# Patient Record
Sex: MALE | Race: OTHER | Employment: OTHER | URBAN - METROPOLITAN AREA
[De-identification: names, ages, dates, MRNs, and addresses within clinical notes are randomized per-mention and may not be internally consistent; named-entity substitution may affect disease eponyms.]

---

## 2017-10-04 ENCOUNTER — HOSPITAL ENCOUNTER (EMERGENCY)
Age: 52
Discharge: HOME | End: 2017-10-05
Attending: EMERGENCY MEDICINE | Admitting: PSYCHIATRY & NEUROLOGY
Payer: COMMERCIAL

## 2017-10-04 DIAGNOSIS — F10.939 ALCOHOL WITHDRAWAL SYNDROME WITH COMPLICATION (HCC): Primary | ICD-10-CM

## 2017-10-04 DIAGNOSIS — F32.A DEPRESSION, UNSPECIFIED DEPRESSION TYPE: ICD-10-CM

## 2017-10-04 LAB
ALBUMIN SERPL-MCNC: 3.1 G/DL (ref 3.4–5)
ALBUMIN/GLOB SERPL: 0.8 {RATIO} (ref 0.8–1.7)
ALP SERPL-CCNC: 104 U/L (ref 45–117)
ALT SERPL-CCNC: 189 U/L (ref 16–61)
AMPHET UR QL SCN: NEGATIVE
ANION GAP SERPL CALC-SCNC: 15 MMOL/L (ref 3–18)
AST SERPL-CCNC: 275 U/L (ref 15–37)
BARBITURATES UR QL SCN: NEGATIVE
BASOPHILS # BLD: 0 K/UL (ref 0–0.1)
BASOPHILS NFR BLD: 1 % (ref 0–2)
BENZODIAZ UR QL: NEGATIVE
BILIRUB SERPL-MCNC: 1.1 MG/DL (ref 0.2–1)
BUN SERPL-MCNC: 9 MG/DL (ref 7–18)
BUN/CREAT SERPL: 9 (ref 12–20)
CALCIUM SERPL-MCNC: 8.1 MG/DL (ref 8.5–10.1)
CANNABINOIDS UR QL SCN: NEGATIVE
CHLORIDE SERPL-SCNC: 100 MMOL/L (ref 100–108)
CO2 SERPL-SCNC: 20 MMOL/L (ref 21–32)
COCAINE UR QL SCN: NEGATIVE
CREAT SERPL-MCNC: 0.97 MG/DL (ref 0.6–1.3)
DIFFERENTIAL METHOD BLD: ABNORMAL
EOSINOPHIL # BLD: 0 K/UL (ref 0–0.4)
EOSINOPHIL NFR BLD: 1 % (ref 0–5)
ERYTHROCYTE [DISTWIDTH] IN BLOOD BY AUTOMATED COUNT: 15 % (ref 11.6–14.5)
ETHANOL SERPL-MCNC: 33 MG/DL (ref 0–3)
GLOBULIN SER CALC-MCNC: 3.8 G/DL (ref 2–4)
GLUCOSE SERPL-MCNC: 108 MG/DL (ref 74–99)
HCT VFR BLD AUTO: 40.9 % (ref 36–48)
HDSCOM,HDSCOM: NORMAL
HGB BLD-MCNC: 14.7 G/DL (ref 13–16)
LYMPHOCYTES # BLD: 1.5 K/UL (ref 0.9–3.6)
LYMPHOCYTES NFR BLD: 36 % (ref 21–52)
MCH RBC QN AUTO: 32.8 PG (ref 24–34)
MCHC RBC AUTO-ENTMCNC: 35.9 G/DL (ref 31–37)
MCV RBC AUTO: 91.3 FL (ref 74–97)
METHADONE UR QL: NEGATIVE
MONOCYTES # BLD: 0.4 K/UL (ref 0.05–1.2)
MONOCYTES NFR BLD: 10 % (ref 3–10)
NEUTS SEG # BLD: 2.2 K/UL (ref 1.8–8)
NEUTS SEG NFR BLD: 52 % (ref 40–73)
OPIATES UR QL: NEGATIVE
PCP UR QL: NEGATIVE
PLATELET # BLD AUTO: 142 K/UL (ref 135–420)
PMV BLD AUTO: 9.9 FL (ref 9.2–11.8)
POTASSIUM SERPL-SCNC: 3.9 MMOL/L (ref 3.5–5.5)
PROT SERPL-MCNC: 6.9 G/DL (ref 6.4–8.2)
RBC # BLD AUTO: 4.48 M/UL (ref 4.7–5.5)
SODIUM SERPL-SCNC: 135 MMOL/L (ref 136–145)
WBC # BLD AUTO: 4.1 K/UL (ref 4.6–13.2)

## 2017-10-04 PROCEDURE — 96360 HYDRATION IV INFUSION INIT: CPT

## 2017-10-04 PROCEDURE — 74011250637 HC RX REV CODE- 250/637: Performed by: EMERGENCY MEDICINE

## 2017-10-04 PROCEDURE — 80307 DRUG TEST PRSMV CHEM ANLYZR: CPT | Performed by: EMERGENCY MEDICINE

## 2017-10-04 PROCEDURE — 74011250636 HC RX REV CODE- 250/636: Performed by: EMERGENCY MEDICINE

## 2017-10-04 PROCEDURE — 80053 COMPREHEN METABOLIC PANEL: CPT | Performed by: EMERGENCY MEDICINE

## 2017-10-04 PROCEDURE — 85025 COMPLETE CBC W/AUTO DIFF WBC: CPT | Performed by: EMERGENCY MEDICINE

## 2017-10-04 PROCEDURE — 99285 EMERGENCY DEPT VISIT HI MDM: CPT

## 2017-10-04 RX ORDER — LORAZEPAM 1 MG/1
1 TABLET ORAL
Status: DISCONTINUED | OUTPATIENT
Start: 2017-10-04 | End: 2017-10-05 | Stop reason: HOSPADM

## 2017-10-04 RX ORDER — LORAZEPAM 1 MG/1
1 TABLET ORAL ONCE
Status: COMPLETED | OUTPATIENT
Start: 2017-10-04 | End: 2017-10-04

## 2017-10-04 RX ORDER — IBUPROFEN 200 MG
1 TABLET ORAL ONCE
Status: COMPLETED | OUTPATIENT
Start: 2017-10-04 | End: 2017-10-05

## 2017-10-04 RX ADMIN — LORAZEPAM 1 MG: 1 TABLET ORAL at 13:46

## 2017-10-04 RX ADMIN — LORAZEPAM 1 MG: 1 TABLET ORAL at 23:27

## 2017-10-04 RX ADMIN — SODIUM CHLORIDE 1000 ML: 900 INJECTION, SOLUTION INTRAVENOUS at 10:08

## 2017-10-04 RX ADMIN — LORAZEPAM 1 MG: 1 TABLET ORAL at 10:08

## 2017-10-04 NOTE — BSMART NOTE
COMPREHENSIVE ASSESSMENT FORM PART 1    SECTION I - DISPOSITION  Patient was discussed with Dr. Su Cruz while patient was in Bed 15 of the ER. Patient's reason for admission:  Abdominal pain, request for alcohol withdrawal detoxification     SECTION II - INTEGRATED SUMMARY  CHIEF COMPLAINT:  Patient is a  47year old who presents to the Emergency Room for a crisis evaluation. Patient states he is with [facility] practice and he spoke with Gunnar Maurice, a Therapist\" who told him to [facility] your things and come to the ER. \"  Patient states he has not been able to eat for the past week, has significant weight loss, has been feeling lightheaded and is currently having problems with his eyes (redness located on the skin under the left eye). He has a drinking ritual that begins at 0900 and ends at 12 midnight each day. He admits to drinking beer, 1 case/24 beers daily. He last drank, last night. \"My drinking is planned,\" patient states. He also states \"all the drinking began\" when his mother passed away in April, 2017 and he has since \"gone off the deep end\" behind her death as he was her sole provider and she resided with him. Patient then stated he was admitted to [facility] [place] for alcohol detox \"about a year ago. \"  Actual [facility] [place] inpatient treatment was 01/07-11/2016. Patient states his psychiatrist is Severiano Mcgraw who was last seen by patient, Juanita Ladd a year ago. \"  Patient states that because he begins drinking at 9am, it is hard for him to plan things since activities, appointments need to be scheduled before he has his first drink of the day. Patient denies being depressed. He denies SI and HI. [address]  Patient is dressed in hospital paper scrubs and slip resistant socks. His speech is clear. His affect is calm and cooperative while responding to questions asked. He is alert and oriented to person, place, time and situation.   The patient's memory shows no evidence of impairment. This patient will be referred to the [facility] ([facility]) for further assessment and evaluation for a possible Crisis Stabilization Unit and/or other facility admission.         Long Mcginnis RN, BSN

## 2017-10-04 NOTE — ED NOTES
Health[employer] meal tray provided and tolerated well. No ss distress. Awaiting bed placement in crisis center. Will continue to monitor closely.

## 2017-10-04 NOTE — ED PROVIDER NOTES
HPI Comments: Gaetano Oakley is a 47 y.o. Male who presents to the ED with request of alcohol detox. Patient reports he was advised by Isabella Alva from [facility] to visit the ED for evaluation. Admits his mother passed away in April 2017 and he has been drinking 1 case of natural light beer everyday from 10AM to Doctor Felisha 91 his last drink was at midnight last night. Also reports depressed mood, shakiness and loss of balance. Patient notes a decreased appetite and increased weight loss, claims he began drinking Ensure, without improvement. Reports abdominal cramping currently. Admits to difficulty sleeping for which he takes Trazadone without relief. Reports visual hallucinations, denies SI or HI. Notes hx of bipolar disorder and schizophrenia in mother. Admits he has not visited his psychiatrist in the past year. Denies hx of HTN or DM. Admits to smoking, denies illicit drug use. No other symptoms or concerns were expressed. The history is provided by the patient. Past Medical History:   Diagnosis Date    Alcohol abuse        No past surgical history on file. No family history on file. Social History     Social History    Marital status:      Spouse name: N/A    Number of children: N/A    Years of education: N/A     Occupational History    Not on file. Social History Main Topics    Smoking status: Not on file    Smokeless tobacco: Not on file    Alcohol use Yes      Comment: 12 beers a day    Drug use: No    Sexual activity: Not on file     Other Topics Concern    Not on file     Social History Narrative    No narrative on file         ALLERGIES: Amoxicillin    Review of Systems   Constitutional: Negative for chills and fever. Respiratory: Negative for shortness of breath. Cardiovascular: Negative for chest pain. Gastrointestinal: Positive for abdominal pain. Negative for diarrhea and nausea. Genitourinary: Negative for difficulty urinating, dysuria and frequency. Neurological: Negative for headaches. Psychiatric/Behavioral: Positive for dysphoric mood and sleep disturbance. Negative for suicidal ideas. All other systems reviewed and are negative. Vitals:    10/04/17 1001 10/04/17 1203 10/04/17 1326 10/04/17 1346   BP:  147/76 115/72    Pulse: (!) 104 97  (!) 108   Resp:  17     Temp:       SpO2:  96%  99%   Weight:       Height:                Physical Exam   Constitutional: He is oriented to person, place, and time. He appears well-developed. HENT:   Head: Normocephalic and atraumatic. Eyes: Conjunctivae and EOM are normal.   Neck: Normal range of motion. Cardiovascular: Normal heart sounds. Exam reveals no gallop and no friction rub. No murmur heard. Occasional tachycardia   Pulmonary/Chest: Effort normal and breath sounds normal. No stridor. Abdominal: Soft. There is no tenderness. Musculoskeletal: Normal range of motion. He exhibits no tenderness. Neurological: He is alert and oriented to person, place, and time. Skin: Skin is warm and dry. He is not diaphoretic. Psychiatric: He exhibits a depressed mood. tearful   Nursing note and vitals reviewed. MDM  Number of Diagnoses or Management Options  Diagnosis management comments: 47 y.o. Male with chronic ETOH use presents now with sx of shakiness, will need to treat symptomatically for withdrawal. Currently hypertensive with HR of 99, will treat with Ativan and fluids. No aleah SI or HI, with visual hallucinations. Will reevaluate later, if required will consult crisis for evaluation. Will check labs for blood count, platelets and liver function.     ED Course       Procedures  Vitals:  Patient Vitals for the past 12 hrs:   Temp Pulse Resp BP SpO2   10/04/17 1346 - (!) 108 - - 99 %   10/04/17 1326 - - - 115/72 -   10/04/17 1203 - 97 17 147/76 96 %   10/04/17 1001 - (!) 104 - - -   10/04/17 1000 - - - 130/53 95 %   10/04/17 0945 98.7 °F (37.1 °C) (!) 111 18 (!) 190/159 98 % Medications ordered:   Medications   nicotine (NICODERM CQ) 14 mg/24 hr patch 1 Patch (1 Patch TransDERmal Apply Patch 10/4/17 1346)   LORazepam (ATIVAN) tablet 1 mg (1 mg Oral Given 10/4/17 1008)   sodium chloride 0.9 % bolus infusion 1,000 mL (0 mL IntraVENous IV Completed 10/4/17 1100)   LORazepam (ATIVAN) tablet 1 mg (1 mg Oral Given 10/4/17 1346)         Lab findings:  Recent Results (from the past 12 hour(s))   CBC WITH AUTOMATED DIFF    Collection Time: 10/04/17 10:04 AM   Result Value Ref Range    WBC 4.1 (L) 4.6 - 13.2 K/uL    RBC 4.48 (L) 4.70 - 5.50 M/uL    HGB 14.7 13.0 - 16.0 g/dL    HCT 40.9 36.0 - 48.0 %    MCV 91.3 74.0 - 97.0 FL    MCH 32.8 24.0 - 34.0 PG    MCHC 35.9 31.0 - 37.0 g/dL    RDW 15.0 (H) 11.6 - 14.5 %    PLATELET 802 159 - 954 K/uL    MPV 9.9 9.2 - 11.8 FL    NEUTROPHILS 52 40 - 73 %    LYMPHOCYTES 36 21 - 52 %    MONOCYTES 10 3 - 10 %    EOSINOPHILS 1 0 - 5 %    BASOPHILS 1 0 - 2 %    ABS. NEUTROPHILS 2.2 1.8 - 8.0 K/UL    ABS. LYMPHOCYTES 1.5 0.9 - 3.6 K/UL    ABS. MONOCYTES 0.4 0.05 - 1.2 K/UL    ABS. EOSINOPHILS 0.0 0.0 - 0.4 K/UL    ABS. BASOPHILS 0.0 0.0 - 0.1 K/UL    DF AUTOMATED     ETHYL ALCOHOL    Collection Time: 10/04/17 10:04 AM   Result Value Ref Range    ALCOHOL(ETHYL),SERUM 33 (H) 0 - 3 MG/DL   METABOLIC PANEL, COMPREHENSIVE    Collection Time: 10/04/17 10:04 AM   Result Value Ref Range    Sodium 135 (L) 136 - 145 mmol/L    Potassium 3.9 3.5 - 5.5 mmol/L    Chloride 100 100 - 108 mmol/L    CO2 20 (L) 21 - 32 mmol/L    Anion gap 15 3.0 - 18 mmol/L    Glucose 108 (H) 74 - 99 mg/dL    BUN 9 7.0 - 18 MG/DL    Creatinine 0.97 0.6 - 1.3 MG/DL    BUN/Creatinine ratio 9 (L) 12 - 20      GFR est AA >60 >60 ml/min/1.73m2    GFR est non-AA >60 >60 ml/min/1.73m2    Calcium 8.1 (L) 8.5 - 10.1 MG/DL    Bilirubin, total 1.1 (H) 0.2 - 1.0 MG/DL    ALT (SGPT) 189 (H) 16 - 61 U/L    AST (SGOT) 275 (H) 15 - 37 U/L    Alk.  phosphatase 104 45 - 117 U/L    Protein, total 6.9 6.4 - 8.2 g/dL    Albumin 3.1 (L) 3.4 - 5.0 g/dL    Globulin 3.8 2.0 - 4.0 g/dL    A-G Ratio 0.8 0.8 - 1.7     DRUG SCREEN, URINE    Collection Time: 10/04/17 10:30 AM   Result Value Ref Range    BENZODIAZEPINES NEGATIVE  NEG      BARBITURATES NEGATIVE  NEG      THC (TH-CANNABINOL) NEGATIVE  NEG      OPIATES NEGATIVE  NEG      PCP(PHENCYCLIDINE) NEGATIVE  NEG      COCAINE NEGATIVE  NEG      AMPHETAMINES NEGATIVE  NEG      METHADONE NEGATIVE  NEG      HDSCOM (NOTE)        Progress notes, Consult notes or additional Procedure notes:   11:05 AM reevaluated patient, patient reports feeling sad and depressed. Patient not tachycardic, improved sx. Will consult crisis. Consult:  Discussed care with marialuisa Wang. Standard discussion; including history of patients chief complaint, available diagnostic results, and treatment course. Gianfranco Au aware of patient in ED. Will evaluate patient. 11:11 AM, 10/4/2017   Consult:  Discussed care with ALEKSANDER Moy. Standard discussion; including history of patients chief complaint, available diagnostic results, and treatment course. Patient has insurance, will be admitted here for further alcohol detox and psychiatric care. 4:49 PM, 10/4/2017       10:17 AM Consult: I discussed care with Gianfranco Au (Crisis). Pt now improved in terms of alcohol withdrawal and no longer suicidal/depressed. He has helped set up follow up for pt with [facility], and pt is now back to his baseline.     -Re-evaluted the patient - improved. -They verbally convey understanding and agreement of the signs, symptoms, diagnosis, treatment and prognosis and additionally agree to follow up as discussed. All questions were answered, and we reviewed pertinent return precautions as seen in the discharge paperwork. Pt understood follow up instructions, and would return to the ED if any worsening or concerns.      Mike Rodriguez MD  10:18 AM          Disposition:  Diagnosis: Depression, alcohol withdrawal    Disposition: Admit to inpt psychiatry >>> DISCHARGE     Follow-up Information     None           Patient's Medications   Start Taking    No medications on file   Continue Taking    PROMETHAZINE (PHENERGAN) 25 MG TABLET    Take 1 Tab by mouth every six (6) hours as needed for up to 10 doses. Indications: EXCESSIVE VOMITING IN PREGNANCY   These Medications have changed    No medications on file   Stop Taking    No medications on file         Scribe Attestation      May Fu acting as a scribe for and in the presence of Karlo Strange MD      October 04, 2017 at 10:11 AM       Provider Attestation:      I personally performed the services described in the documentation, reviewed the documentation, as recorded by the scribe in my presence, and it accurately and completely records my words and actions.  October 04, 2017 at 10:11 AM - Karlo Strange MD

## 2017-10-04 NOTE — ED NOTES
Patient resting comfortably with eyes closed. No ss distress. Lights dimmed and call bell within reach. Will continue to monitor closely.

## 2017-10-04 NOTE — ED NOTES
Patient provide peanut butter and jelly sandwich with ginger ale and tolerated well. No ss distress. Will continue to monitor closely while awaiting further orders.

## 2017-10-04 NOTE — ED NOTES
Patient noted to be walking out of room with gown removed and clothes on. When stopped to ask, he states that SW was in room and states he will be \"waiting here for a couple of hours\" until disposition. He requests walking to car to retrieve cell phone and to \"warm up\" because room \"so cold\". Explained to patient that he could not leave with IV in, patient addiment that he would be returning to room. Recommended removing IV for now. Explained that IV may be needed at later time and patient expressed understanding, but still requested IV be removed. Security notified that patient would be returning to room after visiting car.

## 2017-10-04 NOTE — ED TRIAGE NOTES
Patient states he has been drinking 24 cans of beer since April when his mother passed. States for the last week, he has been experiencing diffuse abdominal pain with NVD.

## 2017-10-05 VITALS
SYSTOLIC BLOOD PRESSURE: 129 MMHG | HEART RATE: 88 BPM | TEMPERATURE: 98.2 F | WEIGHT: 170 LBS | HEIGHT: 68 IN | BODY MASS INDEX: 25.76 KG/M2 | RESPIRATION RATE: 16 BRPM | OXYGEN SATURATION: 98 % | DIASTOLIC BLOOD PRESSURE: 80 MMHG

## 2017-10-05 PROBLEM — F10.20 ALCOHOL DEPENDENCE (HCC): Status: ACTIVE | Noted: 2017-10-05

## 2017-10-05 PROCEDURE — 74011250637 HC RX REV CODE- 250/637: Performed by: EMERGENCY MEDICINE

## 2017-10-05 RX ORDER — TRAZODONE HYDROCHLORIDE 50 MG/1
50 TABLET ORAL
Status: CANCELLED | OUTPATIENT
Start: 2017-10-05

## 2017-10-05 RX ORDER — HALOPERIDOL 10 MG/1
5 TABLET ORAL
Status: CANCELLED | OUTPATIENT
Start: 2017-10-05

## 2017-10-05 RX ORDER — LOPERAMIDE HYDROCHLORIDE 2 MG/1
2 CAPSULE ORAL
Status: CANCELLED | OUTPATIENT
Start: 2017-10-05

## 2017-10-05 RX ORDER — LANOLIN ALCOHOL/MO/W.PET/CERES
100 CREAM (GRAM) TOPICAL DAILY
Status: CANCELLED | OUTPATIENT
Start: 2017-10-05

## 2017-10-05 RX ORDER — LORAZEPAM 2 MG/ML
1-2 INJECTION INTRAMUSCULAR
Status: CANCELLED | OUTPATIENT
Start: 2017-10-05

## 2017-10-05 RX ORDER — PANTOPRAZOLE SODIUM 20 MG/1
20 TABLET, DELAYED RELEASE ORAL DAILY
Status: CANCELLED | OUTPATIENT
Start: 2017-10-05

## 2017-10-05 RX ORDER — IBUPROFEN 600 MG/1
600 TABLET ORAL
Status: CANCELLED | OUTPATIENT
Start: 2017-10-05

## 2017-10-05 RX ORDER — CHLORDIAZEPOXIDE HYDROCHLORIDE 25 MG/1
25 CAPSULE, GELATIN COATED ORAL
Status: CANCELLED | OUTPATIENT
Start: 2017-10-05

## 2017-10-05 RX ORDER — PROMETHAZINE HYDROCHLORIDE 25 MG/ML
6.25 INJECTION, SOLUTION INTRAMUSCULAR; INTRAVENOUS
Status: CANCELLED | OUTPATIENT
Start: 2017-10-05 | End: 2017-10-06

## 2017-10-05 RX ORDER — LORAZEPAM 1 MG/1
1-2 TABLET ORAL
Status: CANCELLED | OUTPATIENT
Start: 2017-10-05

## 2017-10-05 RX ORDER — HALOPERIDOL 5 MG/ML
5 INJECTION INTRAMUSCULAR
Status: CANCELLED | OUTPATIENT
Start: 2017-10-05

## 2017-10-05 RX ORDER — CHLORDIAZEPOXIDE HYDROCHLORIDE 5 MG/1
25 CAPSULE, GELATIN COATED ORAL
Qty: 10 CAP | Refills: 0 | Status: ON HOLD | OUTPATIENT
Start: 2017-10-05 | End: 2018-09-11

## 2017-10-05 RX ADMIN — LORAZEPAM 1 MG: 1 TABLET ORAL at 05:32

## 2017-10-05 NOTE — ED NOTES
Received bedside report from Radha Pelayo, [place], pt alert and oriented, no complaints, VSS, pt awaiting crisis bed assignment, pt tolerating food well, receiving ativan as needed, pt denies complaint at this time, no withdrawal symptoms currently, least drink midnight, pt states he has been drinking heavily for months since his mother passed, denies HI, SI, hallucinations at this time. Pt put in paper scrubs, call bell in reach, safety intact.

## 2017-10-05 NOTE — BSMART NOTE
Met with patient in assigned room. Informed him that discharges are planned and to anticipate coming over to BMS. Patient stated he currently is feeling better, not shaky, not nauseated, and able to eat. States he feels that he rather go home that be admitted for detox. Asked if can have medication for another two days if needed for detox symptoms. Patient denies any suicidal ideations. Discussed with Dr. Santos Sanchez in the ER. Will give prescription for Librium for any alcohol withdrawal symptoms. Patient receptive of this and grateful. States he plans to find a new provider for medications. Not satisfied with current provider a CPA. Given outpatient referral list and AA meeting guide.

## 2017-10-05 NOTE — BSMART NOTE
Addendum:  Pt with daily alcohol use, 24 beers daily for an extended period of time. In acute withdrawal in the ED as evidenced by ; elevated VS, mild tremors even with the 4 doses of ativan given in the ED, mildly diaphoretic, and multiple alcohol related medical complaints. Discussed with Dr Carolyne Garduno, orders received for adm. Discussed with MATTHIEU VALERA.       Pt will be adm to first bed that becomes available in am on the open adult unit.

## 2017-10-05 NOTE — ED NOTES
Nursing report given to Vikki, [facility]. Patient resting comfortably on stretcher, denies any complaints at this time.

## 2018-03-12 ENCOUNTER — APPOINTMENT (OUTPATIENT)
Dept: CT IMAGING | Age: 53
End: 2018-03-12
Attending: EMERGENCY MEDICINE
Payer: COMMERCIAL

## 2018-03-12 ENCOUNTER — HOSPITAL ENCOUNTER (EMERGENCY)
Age: 53
Discharge: HOME | End: 2018-03-12
Attending: EMERGENCY MEDICINE | Admitting: EMERGENCY MEDICINE
Payer: COMMERCIAL

## 2018-03-12 ENCOUNTER — APPOINTMENT (OUTPATIENT)
Dept: GENERAL RADIOLOGY | Age: 53
End: 2018-03-12
Attending: EMERGENCY MEDICINE
Payer: COMMERCIAL

## 2018-03-12 VITALS
HEIGHT: 68 IN | TEMPERATURE: 98.6 F | HEART RATE: 121 BPM | DIASTOLIC BLOOD PRESSURE: 104 MMHG | RESPIRATION RATE: 16 BRPM | WEIGHT: 180 LBS | BODY MASS INDEX: 27.28 KG/M2 | OXYGEN SATURATION: 100 % | SYSTOLIC BLOOD PRESSURE: 132 MMHG

## 2018-03-12 DIAGNOSIS — K75.9 HEPATITIS: ICD-10-CM

## 2018-03-12 DIAGNOSIS — F10.20 UNCOMPLICATED ALCOHOL DEPENDENCE (HCC): Primary | ICD-10-CM

## 2018-03-12 DIAGNOSIS — R10.84 ABDOMINAL PAIN, GENERALIZED: ICD-10-CM

## 2018-03-12 DIAGNOSIS — R74.8 ELEVATED LIPASE: ICD-10-CM

## 2018-03-12 LAB
ALBUMIN SERPL-MCNC: 4 G/DL (ref 3.4–5)
ALBUMIN/GLOB SERPL: 1.1 {RATIO} (ref 0.8–1.7)
ALP SERPL-CCNC: 85 U/L (ref 45–117)
ALT SERPL-CCNC: 220 U/L (ref 16–61)
AMPHET UR QL SCN: NEGATIVE
ANION GAP SERPL CALC-SCNC: 11 MMOL/L (ref 3–18)
AST SERPL-CCNC: 452 U/L (ref 15–37)
ATRIAL RATE: 93 BPM
BARBITURATES UR QL SCN: NEGATIVE
BASOPHILS # BLD: 0 K/UL (ref 0–0.06)
BASOPHILS NFR BLD: 0 % (ref 0–2)
BENZODIAZ UR QL: NEGATIVE
BILIRUB SERPL-MCNC: 1.3 MG/DL (ref 0.2–1)
BUN SERPL-MCNC: 3 MG/DL (ref 7–18)
BUN/CREAT SERPL: 3 (ref 12–20)
CALCIUM SERPL-MCNC: 10.1 MG/DL (ref 8.5–10.1)
CALCULATED P AXIS, ECG09: 48 DEGREES
CALCULATED R AXIS, ECG10: 32 DEGREES
CALCULATED T AXIS, ECG11: 39 DEGREES
CANNABINOIDS UR QL SCN: NEGATIVE
CHLORIDE SERPL-SCNC: 97 MMOL/L (ref 100–108)
CO2 SERPL-SCNC: 25 MMOL/L (ref 21–32)
COCAINE UR QL SCN: NEGATIVE
CREAT SERPL-MCNC: 1.05 MG/DL (ref 0.6–1.3)
DIAGNOSIS, 93000: NORMAL
DIFFERENTIAL METHOD BLD: ABNORMAL
EOSINOPHIL # BLD: 0.1 K/UL (ref 0–0.4)
EOSINOPHIL NFR BLD: 1 % (ref 0–5)
ERYTHROCYTE [DISTWIDTH] IN BLOOD BY AUTOMATED COUNT: 13.7 % (ref 11.6–14.5)
ETHANOL SERPL-MCNC: <3 MG/DL (ref 0–3)
GLOBULIN SER CALC-MCNC: 3.6 G/DL (ref 2–4)
GLUCOSE SERPL-MCNC: 131 MG/DL (ref 74–99)
HCT VFR BLD AUTO: 43.9 % (ref 36–48)
HDSCOM,HDSCOM: NORMAL
HGB BLD-MCNC: 15.9 G/DL (ref 13–16)
LIPASE SERPL-CCNC: 513 U/L (ref 73–393)
LYMPHOCYTES # BLD: 1.6 K/UL (ref 0.9–3.6)
LYMPHOCYTES NFR BLD: 17 % (ref 21–52)
MCH RBC QN AUTO: 36.8 PG (ref 24–34)
MCHC RBC AUTO-ENTMCNC: 36.2 G/DL (ref 31–37)
MCV RBC AUTO: 101.6 FL (ref 74–97)
METHADONE UR QL: NEGATIVE
MONOCYTES # BLD: 0.7 K/UL (ref 0.05–1.2)
MONOCYTES NFR BLD: 7 % (ref 3–10)
NEUTS SEG # BLD: 7.3 K/UL (ref 1.8–8)
NEUTS SEG NFR BLD: 75 % (ref 40–73)
OPIATES UR QL: NEGATIVE
P-R INTERVAL, ECG05: 164 MS
PCP UR QL: NEGATIVE
PLATELET # BLD AUTO: 155 K/UL (ref 135–420)
PMV BLD AUTO: 10.8 FL (ref 9.2–11.8)
POTASSIUM SERPL-SCNC: 4.2 MMOL/L (ref 3.5–5.5)
PROT SERPL-MCNC: 7.6 G/DL (ref 6.4–8.2)
Q-T INTERVAL, ECG07: 338 MS
QRS DURATION, ECG06: 82 MS
QTC CALCULATION (BEZET), ECG08: 420 MS
RBC # BLD AUTO: 4.32 M/UL (ref 4.7–5.5)
SODIUM SERPL-SCNC: 133 MMOL/L (ref 136–145)
VENTRICULAR RATE, ECG03: 93 BPM
WBC # BLD AUTO: 9.7 K/UL (ref 4.6–13.2)

## 2018-03-12 PROCEDURE — 74176 CT ABD & PELVIS W/O CONTRAST: CPT

## 2018-03-12 PROCEDURE — 99284 EMERGENCY DEPT VISIT MOD MDM: CPT

## 2018-03-12 PROCEDURE — 85025 COMPLETE CBC W/AUTO DIFF WBC: CPT | Performed by: EMERGENCY MEDICINE

## 2018-03-12 PROCEDURE — 74011250637 HC RX REV CODE- 250/637: Performed by: EMERGENCY MEDICINE

## 2018-03-12 PROCEDURE — 71045 X-RAY EXAM CHEST 1 VIEW: CPT

## 2018-03-12 PROCEDURE — 80307 DRUG TEST PRSMV CHEM ANLYZR: CPT | Performed by: EMERGENCY MEDICINE

## 2018-03-12 PROCEDURE — 93005 ELECTROCARDIOGRAM TRACING: CPT

## 2018-03-12 PROCEDURE — 83690 ASSAY OF LIPASE: CPT | Performed by: EMERGENCY MEDICINE

## 2018-03-12 PROCEDURE — 80053 COMPREHEN METABOLIC PANEL: CPT | Performed by: EMERGENCY MEDICINE

## 2018-03-12 RX ORDER — CHLORDIAZEPOXIDE HYDROCHLORIDE 25 MG/1
50 CAPSULE, GELATIN COATED ORAL
Status: DISCONTINUED | OUTPATIENT
Start: 2018-03-12 | End: 2018-03-13 | Stop reason: HOSPADM

## 2018-03-12 RX ORDER — ONDANSETRON 4 MG/1
TABLET, ORALLY DISINTEGRATING ORAL
Qty: 10 TAB | Refills: 0 | Status: ON HOLD | OUTPATIENT
Start: 2018-03-12 | End: 2018-09-11

## 2018-03-12 RX ORDER — LORAZEPAM 0.5 MG/1
0.5 TABLET ORAL
Status: COMPLETED | OUTPATIENT
Start: 2018-03-12 | End: 2018-03-12

## 2018-03-12 RX ORDER — LORAZEPAM 0.5 MG/1
1 TABLET ORAL
Qty: 10 TAB | Refills: 0 | Status: SHIPPED | OUTPATIENT
Start: 2018-03-12 | End: 2018-09-11

## 2018-03-12 RX ORDER — ACETAMINOPHEN 325 MG/1
650 TABLET ORAL
Status: COMPLETED | OUTPATIENT
Start: 2018-03-12 | End: 2018-03-12

## 2018-03-12 RX ADMIN — LORAZEPAM 0.5 MG: 0.5 TABLET ORAL at 21:37

## 2018-03-12 RX ADMIN — ACETAMINOPHEN 650 MG: 325 TABLET ORAL at 12:24

## 2018-03-12 RX ADMIN — CHLORDIAZEPOXIDE HYDROCHLORIDE 50 MG: 25 CAPSULE ORAL at 15:32

## 2018-03-12 NOTE — DISCHARGE INSTRUCTIONS
Alcohol and Drug Problems: Care Instructions  Your Care Instructions    You can improve your life and health by stopping your use of alcohol or drugs. Ending dependency on alcohol or drugs may help you feel and sleep better. You may get along better with your family, friends, and coworkers. There are medicines and programs that can help. Follow-up care is a key part of your treatment and safety. Be sure to make and go to all appointments, and call your doctor if you are having problems. It's also a good idea to know your test results and keep a list of the medicines you take. How can you care for yourself at home? · If you have been given medicine to help keep you sober or reduce your cravings, be sure to take it as prescribed. · Talk to your doctor about programs that can help you stop using drugs or drinking alcohol. · If your doctor prescribes disulfiram (Antabuse), do not drink any alcohol while you are taking this medicine. You may have severe, even life-threatening, side effects from even small amounts of alcohol. · Do not tempt yourself by keeping alcohol or drugs in your home. · Learn how to say no when other people drink or use drugs. Or don't spend time with people who drink or use drugs. · Use the time and money spent on drinking or drugs to do something fun with your family or friends. Preventing a relapse  · Do not drink alcohol or use drugs at all. Using any amount of alcohol or drugs greatly increases your risk for relapse. · Seek help from organizations such as Alcoholics Anonymous, Narcotics Anonymous, or treatment facilities if you feel the need to drink alcohol or use drugs again. · Remember that recovery is a lifelong process. · Stay away from situations, friends, or places that may lead you to drink or use drugs. · Have a plan to spot and deal with relapse. Learn to recognize changes in your thinking that lead you to drink or use drugs. These are warning signs.  Get help before you start to drink or use drugs again. · Get help as soon as you can if you relapse. Some people make a plan with another person that outlines what they want that person to do for them if they relapse. The plan usually includes how to handle the relapse and who to notify in case of relapse. · Don't give up. Remember that a relapse does not mean that you have failed. Use the experience to learn the triggers that lead you to drink or use drugs. Then quit again. Many people have several relapses before they are able to quit for good. When should you call for help? Call 911 anytime you think you may need emergency care. For example, call if:  ? · You feel you cannot stop from hurting yourself or someone else. ?Call your doctor now or seek immediate medical care if:  ? · You have serious withdrawal symptoms such as confusion, hallucinations, or severe trembling. ? Watch closely for changes in your health, and be sure to contact your doctor if:  ? · You have a relapse. ? · You need more help or support to stop. Where can you learn more? Go to http://jt-julio.info/. Enter [id number] in the search box to learn more about \"Alcohol and Drug Problems: Care Instructions. \"  Current as of: November 3, 2016  Content Version: 11.4  © 5675-2368 [employer]. Care instructions adapted under license by [employer] (which disclaims liability or warranty for this information). If you have questions about a medical condition or this instruction, always ask your healthcare professional. [place] [address] any warranty or liability for your use of this information. Hepatitis: Care Instructions  Your Care Instructions    Hepatitis is inflammation of the liver. It's caused most often by a virus. It can also be caused by heavy drinking over a long time. Certain medicines can make hepatitis worse.  When this condition is severe, the liver can't remove waste from the body. Your body also can't do its other jobs as it should. · Hepatitis A is spread by food or water that has the virus. This type doesn't lead to long-term liver problems. · Hepatitis B is spread through infected blood, semen, or other body fluids during sex. It's also spread by sharing needles to inject drugs. Most people who have it get better after 4 to 8 weeks. After you have had this virus, you will not get it again. If it stays in your body for a long time, it can cause serious liver damage. · Hepatitis C is spread by sharing needles to use drugs. It is sometimes spread through infected blood, semen, or other body fluids during sex. Most people who have this virus have a long-term infection. Sometimes it causes severe liver damage. · Hepatitis from alcohol use can lead to severe liver problems. If you stop drinking, your liver most often will get better. · Some medicines can cause liver damage. These include over-the-counter and herbal medicines. The infection most often goes away when you stop taking the medicine. But this may not be the case if serious liver damage has already happened. · Hepatitis also can be caused when the immune system attacks the liver. This is called autoimmune hepatitis. You can help your liver heal-or lower the chance of liver damage-by following your doctor's advice. Follow-up care is a key part of your treatment and safety. Be sure to make and go to all appointments, and call your doctor if you are having problems. It's also a good idea to know your test results and keep a list of the medicines you take. How can you care for yourself at home? · Be safe with medicines. If your doctor prescribes antiviral medicine, take it exactly as directed. Do not stop or change a medicine without talking to your doctor first.  · Lower your activity to match your energy. · Avoid alcohol for as long as your doctor says. Alcohol can make liver problems worse.  Tell your doctor if you need help to quit. Counseling, support groups, and sometimes medicines can help you stay sober. · Make sure your doctor knows all the medicines you take. Do not take any new medicines unless your doctor says it is okay. · Follow your doctor's advice about your diet. · If you have itchy skin, keep cool, stay out of the sun. Try to wear cotton clothing. Talk to your doctor about using over-the-counter medicines for itching. These include diphenhydramine (Benadryl) and chlorpheniramine (Chlor-Trimeton). Follow the instructions on the label. To prevent spreading hepatitis B or C  · Tell the people you live with or have sex with about your illness as soon as you can. · Don't donate blood or blood products, organs, semen, or eggs (ova). · Stop all sexual activity or use latex condoms until your doctor tells you that you can no longer give the virus to others. Avoid anal contact with a sex partner while you are infected. · Don't share your personal items. These include razors, toothbrushes, towels, and nail files. · Tell your doctor, dentist, and anyone else who may come in contact with your blood about your illness. · If you are pregnant, tell the doctor who will deliver your baby about your illness. If you have hepatitis B, be sure your baby gets medicine to prevent infection. This should start right after birth. · Clean or carefully get rid of anything that has your blood on it. This includes clothing and sanitary pads. · Make sure to clean surfaces that have your blood or any other body fluid on them. Examples are semen and menstrual blood. Use a solution of bleach and water. To dilute household bleach, follow the directions on the label. Clean toilet seats, countertops, and floors. To prevent hepatitis A  · Always wash your hands after you use the bathroom. And be sure to wash them before you touch food.   · If you have been exposed to someone who may have hepatitis A, ask your doctor about a shot of immune globulin. (This is also called gamma globulin.) It can help your body fight the infection. When should you call for help? Call 911 anytime you think you may need emergency care. For example, call if:  ? · You passed out (lost consciousness). ?Call your doctor now or seek immediate medical care if:  ? · You have new or worse belly pain. ? · You have a new or higher fever. ? · You are dizzy or lightheaded, or you feel like you may faint. ? · You have symptoms of dehydration, such as:  ¨ Dry eyes and a dry mouth. ¨ Passing only a little urine. ¨ Feeling thirstier than normal.   ? · You cannot keep down medicine or fluids. ? · You have new or more blood in stools. ? · You have new or worse vomiting or diarrhea. ? Watch closely for changes in your health, and be sure to contact your doctor if:  ? · You do not get better as expected. Where can you learn more? Go to http://jt-julio.info/. Enter 21  in the search box to learn more about \"Hepatitis: Care Instructions. \"  Current as of: March 3, 2017  Content Version: 11.4  © 3255-8762 [employer]. Care instructions adapted under license by [employer] (which disclaims liability or warranty for this information). If you have questions about a medical condition or this instruction, always ask your healthcare professional. [place] [address] any warranty or liability for your use of this information.

## 2018-03-12 NOTE — BSMART NOTE
Doctor-to-Doctor telephone consult between Dr. Michelle Davidson and Dr. Marley President, ER Physician. Patient denies SI and HI; but requested assistance with his depression and alcohol detoxification. Dr. Evie Alvarado requested Lipase (lab) be rechecked to assess for/rule out pancreatitis (being placed on an NPO diet, etc.) and suggested a possible medical admission. Dr. Amando Grider suggested discharge from the ER with medication (Librium). Crisis office will await Dr. Shannon Jacob call to determine if there will be a medical admission for the patient. Admission to [address] [place] [address] is currently on HOLD.         Nteo Irwin, RN, BSN

## 2018-03-12 NOTE — ED TRIAGE NOTES
Pt requesting alcohol detox & lower back/kidney pain. Pt vomited 3 days ago. Pt denies SI & HI.  Pt states he has not eaten anything for 2 days.

## 2018-03-12 NOTE — ED PROVIDER NOTES
EMERGENCY DEPARTMENT HISTORY AND PHYSICAL EXAM    9:15 AM      Date: 3/12/2018  Patient Name: Amy Penny    History of Presenting Illness     No chief complaint on file. History Provided By: Patient    Additional History (Context): Amy Penny is a 47 y.o. male with EtOH abuse who presents to the ED with a chief complaint of constant and worsening alcohol abuse for the past few months with associated symptoms of abdominal pain, back pain and hand tremors. Patient states \"my stomach is killing me and my back is hurting. \" Patient also states he has bilateral hand tremors from excessive alcohol consumption. Patient reports he is a daily drinker and his last consumption was 2 AM this morning. Patient also reports she would like to go on alcohol detoxification. Patient does not state any modifying factors. Patient denies any other symptoms or complaints. PCP: None    Chief Complaint: EtOH abuse/detoxification  Duration:  Months  Timing:  Constant and Worsening  Location: Generalized  Quality: N/A  Severity: Moderate  Modifying Factors: None  Associated Symptoms: abdominal pain, back pain and hand tremors      Current Outpatient Prescriptions   Medication Sig Dispense Refill    chlordiazePOXIDE (LIBRIUM) 5 mg capsule Take 5 Caps by mouth three (3) times daily as needed for Anxiety. Max Daily Amount: 75 mg. Indications: ALCOHOL WITHDRAWAL SYNDROME 10 Cap 0    promethazine (PHENERGAN) 25 mg tablet Take 1 Tab by mouth every six (6) hours as needed for up to 10 doses. Indications: EXCESSIVE VOMITING IN PREGNANCY 10 Tab 0       Past History     Past Medical History:  Past Medical History:   Diagnosis Date    Alcohol abuse        Past Surgical History:  No past surgical history on file. Family History:  No family history on file.     Social History:  Social History   Substance Use Topics    Smoking status: Not on file    Smokeless tobacco: Not on file    Alcohol use Yes      Comment: 12 beers a day Allergies: Allergies   Allergen Reactions    Amoxicillin Unknown (comments)         Review of Systems     Review of Systems   Constitutional: Negative for fever. Gastrointestinal: Positive for abdominal pain. Musculoskeletal: Positive for back pain. Neurological: Positive for tremors. All other systems reviewed and are negative. Physical Exam     Visit Vitals    BP (!) 132/104 (BP 1 Location: Left arm, BP Patient Position: Sitting)    Pulse (!) 121    Temp 98.6 °F (37 °C)    Resp 16    Ht 5' 8\" (1.727 m)    Wt 81.6 kg (180 lb)    SpO2 100%    BMI 27.37 kg/m2       Physical Exam   Constitutional: He is oriented to person, place, and time. He appears well-developed. HENT:   Head: Normocephalic and atraumatic. Eyes: EOM are normal. Pupils are equal, round, and reactive to light. Neck: Normal range of motion. Neck supple. Cardiovascular: Normal rate, regular rhythm and normal heart sounds. Exam reveals no friction rub. No murmur heard. Pulmonary/Chest: Effort normal and breath sounds normal. No respiratory distress. He has no wheezes. Abdominal: Soft. He exhibits no distension. There is no tenderness. There is no rebound and no guarding. Musculoskeletal: Normal range of motion. Neurological: He is alert and oriented to person, place, and time. Skin: Skin is warm and dry. Psychiatric: He has a normal mood and affect. His behavior is normal. Thought content normal.         Diagnostic Study Results     EKG shows sinus at 93 with normal axis and normal intervals there is no ST elevation or depression or hypertrophy    Medical Decision Making   47year-old male wants to quit drinking. He had some mild left upper quadrant pain from drinking has been going on for a few days. Will check basic labs if normal likely cleared for crisis. 3:53 PM crisis is refusing based on elevated LFTs and elevated lipase.   Dr Denise Galeana stated he does not feel psychiatric admission is beneficial as pt is not SI/Hi. Requesting medical admission    He felt librium was innaproprate, however, per uptodate: \"Dosing: Hepatic Impairment   There are no dosage adjustments provided in the s labeling; however, chlordiazepoxide undergoes hepatic metabolism and should be used with caution. \"     with ativan \"Mild-to-moderate impairment: No dosage adjustment necessary. \" although also metabolized by the liver. Will obtain CT. At this point pt is not meeting criteria for medical admission. 5:43 PM ct noted. No flank pain no dysuria. Labs noted. Pt eating, some mild epigastric discomfort. At this point not meeting inpt criteria; will d/w crisis. 7:18 PM dr Amisha Talamantes consulted. Does not feel comfortable with pt on psych but will not comment if pt need psych admission. Medically pt ok for d/c. Psych will not comment on if they feel pt needs inpt or outpt tx for etoh. Pt would like to go home with ativan.  1mg. Has used in the past. Will dw/ GI. Dw/ GI agree, safe. Diagnosis     No diagnosis found. _______________________________    Attestations:  Scribe Attestation     Feliz Avila acting as a scribe for and in the presence of Zahira Mcdaniel MD      March 12, 2018 at 9:15 AM       Provider Attestation:      I personally performed the services described in the documentation, reviewed the documentation, as recorded by the scribe in my presence, and it accurately and completely records my words and actions.  March 12, 2018 at 9:15 AM - Zahira Mcdaniel MD    _______________________________

## 2018-03-12 NOTE — ED NOTES
Patient resting comfortably on stretcher with eyes closed. No visible signs of distress. Will continue to monitor patient.

## 2018-03-12 NOTE — ED NOTES
Patient states his last drink was around 0230 this morning. He states that he has been drinking for a very long time. He usually drinks a large box of wine (5L) every day. Sometimes he may go to the liquor store and buy a half gallon of \"Marino Beam\" which will last him x3 days.

## 2018-03-12 NOTE — BSMART NOTE
COMPREHENSIVE ASSESSMENT FORM PART 1    SECTION I - DISPOSITION  The patient was initially interviewed while in Bed 21 of the [facility] ER at the request of Dr. Mireya Parson. The request for crisis evaluation is due to patients complaints/comments of depression and alcohol withdrawal/detoxification. The plan WAS to transfer the patient from the [facility] ER to [address] [place] [address] Room #959-29  on the Adult/Chemical Dependency Unit. The unit phone is [phone]. The Psychiatrist consulted was Dr. Anna Schwarz who is also the attending Psychiatrist.    [facility] unit admitting diagnosis is  Alcohol detox and depression    SECTION II - INTEGRATED SUMMARY  INFORMATION WAS PROVIDED BY:  Patient    CHIEF COMPLAINT:  Alcohol Detox  PRECIPITANT FACTORS:  Stomach pain from drinking. Patient is a 47year old male who presents to the Emergency Room for a crisis evaluation. Patient complains of  \"stomach pain from drinking. I need to stop drinking. \"  He states he is depressed. He denies SI. He denies HI. He smokes 2 packs of cigarrettes a day. He communicates daily with his two sons who live in the area. His mother passed away April 4, 2017 so he now lives alone but has a girlfriend. His girlfriend does not live with him. He lives with his [facility]. Patient asked this writer, \"Do they know what is what is going on with my back? Is it my kidneys? \"  Patient also requested, \"something to help me sleep. \"    SI / SELF HARM HX / HI:  No current SI.    PRIOR TREATMENT HX:    INPATIENT:   [facility]. DRUG/ALCOHOL/SMOKING HISTORY (last used/daily usage amount): Was drinking 1 case/24 beers daily; however it is causing too much stomach pain. He now drinks boxed wine, 5 liters a day.     CURRENT BAL:        Ruth Mcneill, RN, BSN

## 2018-03-13 NOTE — ED NOTES
Discharge instructions reviewed with pt. Pt voiced concern over medication. Explained to patient that the ativan is for withdrawal symptoms.   Pt concerned about living alone and having a seizure

## 2018-09-08 PROBLEM — K85.90 PANCREATITIS: Status: ACTIVE | Noted: 2018-09-08

## 2019-01-12 PROBLEM — F10.939 ALCOHOL WITHDRAWAL SYNDROME WITH COMPLICATION (HCC): Status: ACTIVE | Noted: 2019-01-12

## 2019-03-26 PROBLEM — F10.939 ALCOHOL WITHDRAWAL (HCC): Status: ACTIVE | Noted: 2019-03-26

## 2019-04-25 ENCOUNTER — HOSPITAL ENCOUNTER (INPATIENT)
Age: 54
LOS: 6 days | Discharge: HOME HEALTH | DRG: 897 | End: 2019-05-01
Attending: EMERGENCY MEDICINE | Admitting: FAMILY MEDICINE
Payer: COMMERCIAL

## 2019-04-25 DIAGNOSIS — R56.9 SEIZURE (HCC): ICD-10-CM

## 2019-04-25 DIAGNOSIS — F10.939 ALCOHOL WITHDRAWAL SYNDROME WITH COMPLICATION (HCC): Primary | ICD-10-CM

## 2019-04-25 PROBLEM — F10.10 ALCOHOL ABUSE: Status: ACTIVE | Noted: 2019-04-25

## 2019-04-25 PROBLEM — E87.6 HYPOKALEMIA: Status: ACTIVE | Noted: 2019-04-25

## 2019-04-25 LAB
ALBUMIN SERPL-MCNC: 2.8 G/DL (ref 3.4–5)
ALBUMIN/GLOB SERPL: 0.7 {RATIO} (ref 0.8–1.7)
ALP SERPL-CCNC: 89 U/L (ref 45–117)
ALT SERPL-CCNC: 49 U/L (ref 16–61)
AMPHET UR QL SCN: NEGATIVE
ANION GAP SERPL CALC-SCNC: 9 MMOL/L (ref 3–18)
APPEARANCE UR: CLEAR
AST SERPL-CCNC: 77 U/L (ref 15–37)
BARBITURATES UR QL SCN: NEGATIVE
BASOPHILS # BLD: 0 K/UL (ref 0–0.1)
BASOPHILS NFR BLD: 0 % (ref 0–2)
BENZODIAZ UR QL: POSITIVE
BILIRUB SERPL-MCNC: 0.6 MG/DL (ref 0.2–1)
BILIRUB UR QL: NEGATIVE
BUN SERPL-MCNC: 4 MG/DL (ref 7–18)
BUN/CREAT SERPL: 4 (ref 12–20)
CALCIUM SERPL-MCNC: 7.7 MG/DL (ref 8.5–10.1)
CANNABINOIDS UR QL SCN: NEGATIVE
CHLORIDE SERPL-SCNC: 103 MMOL/L (ref 100–108)
CO2 SERPL-SCNC: 27 MMOL/L (ref 21–32)
COCAINE UR QL SCN: NEGATIVE
COLOR UR: NORMAL
CREAT SERPL-MCNC: 0.95 MG/DL (ref 0.6–1.3)
DIFFERENTIAL METHOD BLD: ABNORMAL
EOSINOPHIL # BLD: 0.2 K/UL (ref 0–0.4)
EOSINOPHIL NFR BLD: 2 % (ref 0–5)
ERYTHROCYTE [DISTWIDTH] IN BLOOD BY AUTOMATED COUNT: 15.9 % (ref 11.6–14.5)
ETHANOL SERPL-MCNC: 194 MG/DL (ref 0–3)
GLOBULIN SER CALC-MCNC: 4.2 G/DL (ref 2–4)
GLUCOSE SERPL-MCNC: 124 MG/DL (ref 74–99)
GLUCOSE UR STRIP.AUTO-MCNC: NEGATIVE MG/DL
HCT VFR BLD AUTO: 39 % (ref 36–48)
HDSCOM,HDSCOM: ABNORMAL
HGB BLD-MCNC: 13.2 G/DL (ref 13–16)
HGB UR QL STRIP: NEGATIVE
KETONES UR QL STRIP.AUTO: NEGATIVE MG/DL
LEUKOCYTE ESTERASE UR QL STRIP.AUTO: NEGATIVE
LYMPHOCYTES # BLD: 2.5 K/UL (ref 0.9–3.6)
LYMPHOCYTES NFR BLD: 26 % (ref 21–52)
MCH RBC QN AUTO: 31.1 PG (ref 24–34)
MCHC RBC AUTO-ENTMCNC: 33.8 G/DL (ref 31–37)
MCV RBC AUTO: 91.8 FL (ref 74–97)
METHADONE UR QL: NEGATIVE
MONOCYTES # BLD: 0.8 K/UL (ref 0.05–1.2)
MONOCYTES NFR BLD: 8 % (ref 3–10)
NEUTS SEG # BLD: 6 K/UL (ref 1.8–8)
NEUTS SEG NFR BLD: 64 % (ref 40–73)
NITRITE UR QL STRIP.AUTO: NEGATIVE
OPIATES UR QL: NEGATIVE
PCP UR QL: NEGATIVE
PH UR STRIP: 6 [PH] (ref 5–8)
PLATELET # BLD AUTO: 166 K/UL (ref 135–420)
PMV BLD AUTO: 10.6 FL (ref 9.2–11.8)
POTASSIUM SERPL-SCNC: 2.7 MMOL/L (ref 3.5–5.5)
PROT SERPL-MCNC: 7 G/DL (ref 6.4–8.2)
PROT UR STRIP-MCNC: NEGATIVE MG/DL
RBC # BLD AUTO: 4.25 M/UL (ref 4.7–5.5)
SODIUM SERPL-SCNC: 139 MMOL/L (ref 136–145)
SP GR UR REFRACTOMETRY: 1.01 (ref 1–1.03)
UROBILINOGEN UR QL STRIP.AUTO: 1 EU/DL (ref 0.2–1)
WBC # BLD AUTO: 9.4 K/UL (ref 4.6–13.2)

## 2019-04-25 PROCEDURE — 85025 COMPLETE CBC W/AUTO DIFF WBC: CPT

## 2019-04-25 PROCEDURE — 74011250637 HC RX REV CODE- 250/637: Performed by: EMERGENCY MEDICINE

## 2019-04-25 PROCEDURE — 65660000000 HC RM CCU STEPDOWN

## 2019-04-25 PROCEDURE — 80053 COMPREHEN METABOLIC PANEL: CPT

## 2019-04-25 PROCEDURE — 81003 URINALYSIS AUTO W/O SCOPE: CPT

## 2019-04-25 PROCEDURE — 80307 DRUG TEST PRSMV CHEM ANLYZR: CPT

## 2019-04-25 PROCEDURE — 99283 EMERGENCY DEPT VISIT LOW MDM: CPT

## 2019-04-25 RX ORDER — HEPARIN SODIUM 5000 [USP'U]/ML
5000 INJECTION, SOLUTION INTRAVENOUS; SUBCUTANEOUS EVERY 8 HOURS
Status: DISCONTINUED | OUTPATIENT
Start: 2019-04-25 | End: 2019-05-01 | Stop reason: HOSPADM

## 2019-04-25 RX ORDER — LORAZEPAM 2 MG/ML
2 INJECTION INTRAMUSCULAR
Status: DISCONTINUED | OUTPATIENT
Start: 2019-04-25 | End: 2019-04-26

## 2019-04-25 RX ORDER — SODIUM CHLORIDE 0.9 % (FLUSH) 0.9 %
5-40 SYRINGE (ML) INJECTION AS NEEDED
Status: DISCONTINUED | OUTPATIENT
Start: 2019-04-25 | End: 2019-04-29 | Stop reason: SDUPTHER

## 2019-04-25 RX ORDER — POTASSIUM CHLORIDE 20 MEQ/1
40 TABLET, EXTENDED RELEASE ORAL EVERY 4 HOURS
Status: DISPENSED | OUTPATIENT
Start: 2019-04-25 | End: 2019-04-26

## 2019-04-25 RX ORDER — SODIUM CHLORIDE 0.9 % (FLUSH) 0.9 %
5-40 SYRINGE (ML) INJECTION EVERY 8 HOURS
Status: DISCONTINUED | OUTPATIENT
Start: 2019-04-25 | End: 2019-04-29 | Stop reason: SDUPTHER

## 2019-04-25 RX ORDER — POTASSIUM CHLORIDE 1.5 G/1.77G
40 POWDER, FOR SOLUTION ORAL
Status: COMPLETED | OUTPATIENT
Start: 2019-04-25 | End: 2019-04-26

## 2019-04-25 RX ORDER — MAGNESIUM SULFATE HEPTAHYDRATE 40 MG/ML
2 INJECTION, SOLUTION INTRAVENOUS ONCE
Status: COMPLETED | OUTPATIENT
Start: 2019-04-25 | End: 2019-04-26

## 2019-04-25 RX ORDER — POTASSIUM CHLORIDE, DEXTROSE MONOHYDRATE AND SODIUM CHLORIDE 300; 5; 900 MG/100ML; G/100ML; MG/100ML
INJECTION, SOLUTION INTRAVENOUS CONTINUOUS
Status: DISCONTINUED | OUTPATIENT
Start: 2019-04-25 | End: 2019-04-26

## 2019-04-25 RX ORDER — CHLORDIAZEPOXIDE HYDROCHLORIDE 25 MG/1
25 CAPSULE, GELATIN COATED ORAL
Status: COMPLETED | OUTPATIENT
Start: 2019-04-25 | End: 2019-04-25

## 2019-04-25 RX ORDER — SODIUM CHLORIDE AND POTASSIUM CHLORIDE .9; .15 G/100ML; G/100ML
SOLUTION INTRAVENOUS CONTINUOUS
Status: DISCONTINUED | OUTPATIENT
Start: 2019-04-25 | End: 2019-04-25 | Stop reason: SDUPTHER

## 2019-04-25 RX ADMIN — CHLORDIAZEPOXIDE HYDROCHLORIDE 25 MG: 25 CAPSULE ORAL at 20:30

## 2019-04-25 NOTE — ED TRIAGE NOTES
Pt reports needing help with alcohol detox. Pt reports last drink was 3 hours ago. Pt reports 3 weeks ago when trying to detox at home, he had a seizure.

## 2019-04-25 NOTE — ED NOTES
I performed a brief evaluation, including history and physical, of the patient here in triage and I have determined that pt will need further treatment and evaluation from the main side ER physician. I have placed initial orders to help in expediting patients care. April 25, 2019 at 5:19 PM - Michael Borges PA-C Visit Vitals /72 Pulse (!) 113 Temp 97.9 °F (36.6 °C) Resp 18 SpO2 97%

## 2019-04-26 LAB
ALBUMIN SERPL-MCNC: 2.5 G/DL (ref 3.4–5)
ALBUMIN/GLOB SERPL: 0.7 {RATIO} (ref 0.8–1.7)
ALP SERPL-CCNC: 82 U/L (ref 45–117)
ALT SERPL-CCNC: 43 U/L (ref 16–61)
ANION GAP SERPL CALC-SCNC: 4 MMOL/L (ref 3–18)
AST SERPL-CCNC: 80 U/L (ref 15–37)
BILIRUB SERPL-MCNC: 1.1 MG/DL (ref 0.2–1)
BUN SERPL-MCNC: 4 MG/DL (ref 7–18)
BUN/CREAT SERPL: 5 (ref 12–20)
CALCIUM SERPL-MCNC: 7.5 MG/DL (ref 8.5–10.1)
CHLORIDE SERPL-SCNC: 110 MMOL/L (ref 100–108)
CO2 SERPL-SCNC: 28 MMOL/L (ref 21–32)
CREAT SERPL-MCNC: 0.78 MG/DL (ref 0.6–1.3)
GLOBULIN SER CALC-MCNC: 3.8 G/DL (ref 2–4)
GLUCOSE SERPL-MCNC: 106 MG/DL (ref 74–99)
MAGNESIUM SERPL-MCNC: 1.7 MG/DL (ref 1.6–2.6)
PHOSPHATE SERPL-MCNC: 2.8 MG/DL (ref 2.5–4.9)
POTASSIUM SERPL-SCNC: 4.6 MMOL/L (ref 3.5–5.5)
PROT SERPL-MCNC: 6.3 G/DL (ref 6.4–8.2)
SODIUM SERPL-SCNC: 142 MMOL/L (ref 136–145)

## 2019-04-26 PROCEDURE — 84100 ASSAY OF PHOSPHORUS: CPT

## 2019-04-26 PROCEDURE — 74011000258 HC RX REV CODE- 258: Performed by: HOSPITALIST

## 2019-04-26 PROCEDURE — 65660000000 HC RM CCU STEPDOWN

## 2019-04-26 PROCEDURE — 74011250636 HC RX REV CODE- 250/636: Performed by: INTERNAL MEDICINE

## 2019-04-26 PROCEDURE — 74011250637 HC RX REV CODE- 250/637: Performed by: INTERNAL MEDICINE

## 2019-04-26 PROCEDURE — 80053 COMPREHEN METABOLIC PANEL: CPT

## 2019-04-26 PROCEDURE — 83735 ASSAY OF MAGNESIUM: CPT

## 2019-04-26 PROCEDURE — 74011000258 HC RX REV CODE- 258: Performed by: INTERNAL MEDICINE

## 2019-04-26 PROCEDURE — 74011250637 HC RX REV CODE- 250/637: Performed by: HOSPITALIST

## 2019-04-26 PROCEDURE — 74011250637 HC RX REV CODE- 250/637: Performed by: EMERGENCY MEDICINE

## 2019-04-26 RX ORDER — LORAZEPAM 1 MG/1
1 TABLET ORAL
Status: DISCONTINUED | OUTPATIENT
Start: 2019-04-26 | End: 2019-05-01 | Stop reason: HOSPADM

## 2019-04-26 RX ORDER — FOLIC ACID 1 MG/1
1 TABLET ORAL DAILY
Status: DISCONTINUED | OUTPATIENT
Start: 2019-04-26 | End: 2019-05-01 | Stop reason: HOSPADM

## 2019-04-26 RX ORDER — HYDRALAZINE HYDROCHLORIDE 20 MG/ML
10 INJECTION INTRAMUSCULAR; INTRAVENOUS
Status: DISCONTINUED | OUTPATIENT
Start: 2019-04-26 | End: 2019-05-01 | Stop reason: HOSPADM

## 2019-04-26 RX ORDER — THERA TABS 400 MCG
1 TAB ORAL DAILY
Status: DISCONTINUED | OUTPATIENT
Start: 2019-04-26 | End: 2019-05-01 | Stop reason: HOSPADM

## 2019-04-26 RX ORDER — ACETAMINOPHEN 325 MG/1
650 TABLET ORAL
Status: DISCONTINUED | OUTPATIENT
Start: 2019-04-26 | End: 2019-05-01 | Stop reason: HOSPADM

## 2019-04-26 RX ORDER — LORAZEPAM 2 MG/ML
1 INJECTION INTRAMUSCULAR
Status: DISCONTINUED | OUTPATIENT
Start: 2019-04-26 | End: 2019-05-01 | Stop reason: HOSPADM

## 2019-04-26 RX ORDER — LANOLIN ALCOHOL/MO/W.PET/CERES
100 CREAM (GRAM) TOPICAL DAILY
Status: DISCONTINUED | OUTPATIENT
Start: 2019-04-26 | End: 2019-05-01 | Stop reason: HOSPADM

## 2019-04-26 RX ORDER — DEXTROSE MONOHYDRATE AND SODIUM CHLORIDE 5; .9 G/100ML; G/100ML
75 INJECTION, SOLUTION INTRAVENOUS CONTINUOUS
Status: DISCONTINUED | OUTPATIENT
Start: 2019-04-26 | End: 2019-04-29

## 2019-04-26 RX ORDER — SODIUM CHLORIDE 0.9 % (FLUSH) 0.9 %
5-40 SYRINGE (ML) INJECTION AS NEEDED
Status: DISCONTINUED | OUTPATIENT
Start: 2019-04-26 | End: 2019-05-01 | Stop reason: HOSPADM

## 2019-04-26 RX ORDER — IBUPROFEN 200 MG
1 TABLET ORAL DAILY
Status: DISCONTINUED | OUTPATIENT
Start: 2019-04-26 | End: 2019-05-01 | Stop reason: HOSPADM

## 2019-04-26 RX ORDER — SODIUM CHLORIDE 0.9 % (FLUSH) 0.9 %
5-40 SYRINGE (ML) INJECTION EVERY 8 HOURS
Status: DISCONTINUED | OUTPATIENT
Start: 2019-04-26 | End: 2019-05-01 | Stop reason: HOSPADM

## 2019-04-26 RX ORDER — LORAZEPAM 1 MG/1
2 TABLET ORAL
Status: DISCONTINUED | OUTPATIENT
Start: 2019-04-26 | End: 2019-05-01 | Stop reason: HOSPADM

## 2019-04-26 RX ORDER — LORAZEPAM 2 MG/ML
2 INJECTION INTRAMUSCULAR
Status: DISCONTINUED | OUTPATIENT
Start: 2019-04-26 | End: 2019-05-01 | Stop reason: HOSPADM

## 2019-04-26 RX ADMIN — Medication 10 ML: at 17:17

## 2019-04-26 RX ADMIN — HEPARIN SODIUM 5000 UNITS: 5000 INJECTION INTRAVENOUS; SUBCUTANEOUS at 21:07

## 2019-04-26 RX ADMIN — ACETAMINOPHEN 650 MG: 325 TABLET ORAL at 21:07

## 2019-04-26 RX ADMIN — MAGNESIUM SULFATE HEPTAHYDRATE 2 G: 40 INJECTION, SOLUTION INTRAVENOUS at 00:15

## 2019-04-26 RX ADMIN — POTASSIUM CHLORIDE 40 MEQ: 20 TABLET, EXTENDED RELEASE ORAL at 00:14

## 2019-04-26 RX ADMIN — Medication 10 ML: at 21:12

## 2019-04-26 RX ADMIN — LORAZEPAM 1 MG: 1 TABLET ORAL at 11:37

## 2019-04-26 RX ADMIN — POTASSIUM CHLORIDE 40 MEQ: 1.5 POWDER, FOR SOLUTION ORAL at 00:14

## 2019-04-26 RX ADMIN — THIAMINE HYDROCHLORIDE 100 MG: 100 INJECTION, SOLUTION INTRAMUSCULAR; INTRAVENOUS at 00:15

## 2019-04-26 RX ADMIN — DEXTROSE MONOHYDRATE, SODIUM CHLORIDE, AND POTASSIUM CHLORIDE: 50; 9; 2.98 INJECTION, SOLUTION INTRAVENOUS at 00:16

## 2019-04-26 RX ADMIN — HEPARIN SODIUM 5000 UNITS: 5000 INJECTION INTRAVENOUS; SUBCUTANEOUS at 00:14

## 2019-04-26 RX ADMIN — DEXTROSE MONOHYDRATE AND SODIUM CHLORIDE 75 ML/HR: 5; .9 INJECTION, SOLUTION INTRAVENOUS at 17:12

## 2019-04-26 RX ADMIN — LORAZEPAM 1 MG: 1 TABLET ORAL at 14:42

## 2019-04-26 RX ADMIN — LORAZEPAM 1 MG: 1 TABLET ORAL at 21:07

## 2019-04-26 RX ADMIN — Medication 10 ML: at 00:16

## 2019-04-26 RX ADMIN — Medication 100 MG: at 11:37

## 2019-04-26 RX ADMIN — FOLIC ACID 1 MG: 1 TABLET ORAL at 11:38

## 2019-04-26 RX ADMIN — LORAZEPAM 1 MG: 1 TABLET ORAL at 23:41

## 2019-04-26 RX ADMIN — THERA TABS 1 TABLET: TAB at 11:38

## 2019-04-26 RX ADMIN — HEPARIN SODIUM 5000 UNITS: 5000 INJECTION INTRAVENOUS; SUBCUTANEOUS at 14:12

## 2019-04-26 RX ADMIN — ACETAMINOPHEN 650 MG: 325 TABLET ORAL at 11:38

## 2019-04-26 RX ADMIN — LORAZEPAM 2 MG: 2 INJECTION, SOLUTION INTRAMUSCULAR; INTRAVENOUS at 00:14

## 2019-04-26 NOTE — H&P
History & Physical 
Patient: Lizette De Guzman MRN: [id number]  CSN: [id number] YOB: 1965  Age: 49 y.o. Sex: male DOA: 4/25/2019 Chief Complaint:  
Chief Complaint Patient presents with  Reported Seizure HPI:  
 
 
   Lizette De Guzman is a 49 y.o. male with PMH of alcohol abuse presents with alcohol withdrawal with seizure activity. Patient went to see his primary care doctor who referred him to the emergency department to be admitted for withdrawal.  He states that he drinks 2 bottles of liquor  per day and is unsure if he has an alcohol problem. Patient drunk before he came to the ER He stated he wanted to get Detox and stay in the hospital  
 
Past Medical History:  
Diagnosis Date  Alcohol abuse No past surgical history on file. No family history on file. Social History Socioeconomic History  Marital status:  Spouse name: Not on file  Number of children: Not on file  Years of education: Not on file  Highest education level: Not on file Tobacco Use  Smoking status: Current Every Day Smoker Packs/day: 2.00  Smokeless tobacco: Never Used Substance and Sexual Activity  Alcohol use: Yes Alcohol/week: 2.4 oz Types: 4 Cans of beer per week Comment: 12 beers a day-fifth bourbon  Drug use: No  
 
 
Prior to Admission medications Medication Sig Start Date End Date Taking? Authorizing Provider  
lactulose (CHRONULAC) 10 gram/15 mL solution Take 30 mL by mouth three (3) times daily. 3/30/19   Rutherford Gitelman, DO  
LORazepam (ATIVAN) 1 mg tablet Take 1 Tab by mouth every eight (8) hours as needed for Anxiety. Max Daily Amount: 3 mg. 3/30/19   Rutherford Gitelman, DO  
magnesium oxide (MAG-OX) 400 mg tablet Take 1 Tab by mouth daily. 1/18/19   Dion Patterson MD  
naltrexone (DEPADE) 50 mg tablet Take 1 Tab by mouth daily.  1/17/19   Dion Patterson MD  
 multivit-mineral-iron-lutein (WOMEN'S CENTRUM SILVER WITH LUTEIN) tab tablet Take 1 Tab by mouth daily. 18   Aurora Tucker MD  
folic acid (FOLVITE) 1 mg tablet Take 1 Tab by mouth daily. 18   Aurora Tucker MD  
thiamine (B-1) 100 mg tablet Take 1 Tab by mouth daily. 18   Aurora Tucker MD  
 
 
Allergies Allergen Reactions  Amoxicillin Unknown (comments) Review of Systems GENERAL: Patient alert, awake and oriented times 3, able to communicate full sentences and not in distress. HEENT: No change in vision, no earache, tinnitus, sore throat or sinus congestion. NECK: No pain or stiffness. PULMONARY: No shortness of breath, cough or wheeze. Cardiovascular: no pnd or orthopnea, no CP GASTROINTESTINAL: No abdominal pain, nausea, vomiting or diarrhea, melena or bright red blood per rectum. GENITOURINARY: No urinary frequency, urgency, hesitancy or dysuria. MUSCULOSKELETAL: No joint or muscle pain, no back pain, no recent trauma. DERMATOLOGIC: No rash, no itching, no lesions. ENDOCRINE: No polyuria, polydipsia, no heat or cold intolerance. No recent change in weight. HEMATOLOGICAL: No anemia or easy bruising or bleeding. NEUROLOGIC: No headache, seizures, numbness, tingling or weakness. Physical Exam:  
 
Physical Exam: 
Visit Vitals /72 Pulse (!) 113 Temp 97.9 °F (36.6 °C) Resp 18 SpO2 97% O2 Device: Room air Temp (24hrs), Av.9 °F (36.6 °C), Min:97.9 °F (36.6 °C), Max:97.9 °F (36.6 °C) No intake/output data recorded. No intake/output data recorded. General:  Alert, cooperative, no distress, appears stated age. Head: Normocephalic, without obvious abnormality, atraumatic. Eyes:  Conjunctivae/corneas clear. PERRL, EOMs intact. Nose: Nares normal. No drainage or sinus tenderness. Neck: Supple, symmetrical, trachea midline, no adenopathy, thyroid: no enlargement, no carotid bruit and no JVD. Lungs:   Clear to auscultation bilaterally. Heart:  Regular rate and rhythm, S1, S2 normal.  
  Abdomen: Soft, non-tender. Bowel sounds normal.   
Extremities: Extremities normal, atraumatic, no cyanosis or edema. Pulses: 2+ and symmetric all extremities. Skin:  No rashes or lesions Neurologic: AAOx3, No focal motor or sensory deficit. Labs Reviewed: 
 
BMP:  
Lab Results Component Value Date/Time  04/25/2019 05:35 PM  
 K 2.7 (LL) 04/25/2019 05:35 PM  
  04/25/2019 05:35 PM  
 CO2 27 04/25/2019 05:35 PM  
 AGAP 9 04/25/2019 05:35 PM  
  (H) 04/25/2019 05:35 PM  
 BUN 4 (L) 04/25/2019 05:35 PM  
 CREA 0.95 04/25/2019 05:35 PM  
 GFRAA >60 04/25/2019 05:35 PM  
 GFRNA >60 04/25/2019 05:35 PM  
 
CMP:  
Lab Results Component Value Date/Time  04/25/2019 05:35 PM  
 K 2.7 (LL) 04/25/2019 05:35 PM  
  04/25/2019 05:35 PM  
 CO2 27 04/25/2019 05:35 PM  
 AGAP 9 04/25/2019 05:35 PM  
  (H) 04/25/2019 05:35 PM  
 BUN 4 (L) 04/25/2019 05:35 PM  
 CREA 0.95 04/25/2019 05:35 PM  
 GFRAA >60 04/25/2019 05:35 PM  
 GFRNA >60 04/25/2019 05:35 PM  
 CA 7.7 (L) 04/25/2019 05:35 PM  
 ALB 2.8 (L) 04/25/2019 05:35 PM  
 TP 7.0 04/25/2019 05:35 PM  
 GLOB 4.2 (H) 04/25/2019 05:35 PM  
 AGRAT 0.7 (L) 04/25/2019 05:35 PM  
 SGOT 77 (H) 04/25/2019 05:35 PM  
 ALT 49 04/25/2019 05:35 PM  
 
 
 
Procedures/imaging: see electronic medical records for all procedures/Xrays and details which were not copied into this note but were reviewed prior to creation of Plan Assessment/Plan Active Problems: 
  Alcohol withdrawal ([address] (3/26/2019) Alcohol abuse (4/25/2019) Hypokalemia (4/25/2019) Admit patient to telemetry floor IV fluid D5 NS with KCL  
CIWA protocol Replacement ordered for K Mag given, will recheck Am labs Thiamin IV given Thiamin and Folic acid tabs daily Librium was given in ER Monitor For Delirium tremens Patient counseled on the harmful effects of Alcohol and the need to stop DVT/GI Prophylaxis: Hep SQ Laila Estrada MD 
4/25/2019 10:06 PM

## 2019-04-26 NOTE — PROGRESS NOTES
[facility] Progress Note Patient: Guerrero Byrnes Age: 49 y.o. : 1965 MR#: [id number] SSN: [id number] Date/Time: 2019 Subjective: pt feels ok, slightly jittery. No hallucinations Ambulating in room. Assessment/Plan: 1. Alcohol withdrawal 
2. Alcohol abuse 3. Hypokalemia, better 4. Alcohol withdrawal Sz 
5. BP higher side will monitor  
 
  
Plan: 
Cont IV fluid D5 NS, CIWA protocol Thiamin and Folic acid tabs daily Monitor For Delirium tremens Patient counseled on the harmful effects of Alcohol and the need to st 
Pt belongs to Dr. Vivi Paul, , d/w Dr. Yaz Goodson, will transfer to his service. Case discussed with:  [x]Patient  []Family  [x]Nursing  []Case Management DVT Prophylaxis:  []Lovenox  [x]Hep SQ  []SCDs  []Coumadin   []On Heparin gtt Objective:  
VS:  
Visit Vitals BP (!) 163/97 (BP 1 Location: Left arm, BP Patient Position: At rest) Pulse 82 Temp 98.4 °F (36.9 °C) Resp 16 Ht 5' 8\" (1.727 m) Wt 83.9 kg (185 lb) SpO2 96% BMI 28.13 kg/m² Tmax/24hrs: Temp (24hrs), Av.3 °F (36.8 °C), Min:97.9 °F (36.6 °C), Max:98.4 °F (36.9 °C) IOBRIEFNo intake or output data in the 24 hours ending 19 1528 General:  Alert, cooperative, no acute distress   
Pulmonary:  CTA Bilaterally. No Wheezing/Rales. Cardiovascular: Regular rate and Rhythm. GI:  Soft, Non distended, Non tender. + Bowel sounds. Extremities:  No edema, cyanosis, clubbing. Psych: Good insight. Not anxious or agitated. Neurologic: Alert and oriented X 3. No acute neuro deficits. Additional: mild tremors Medications:  
Current Facility-Administered Medications Medication Dose Route Frequency  folic acid (FOLVITE) tablet 1 mg  1 mg Oral DAILY  thiamine HCL (B-1) tablet 100 mg  100 mg Oral DAILY  acetaminophen (TYLENOL) tablet 650 mg  650 mg Oral Q4H PRN  
  sodium chloride (NS) flush 5-40 mL  5-40 mL IntraVENous Q8H  
 sodium chloride (NS) flush 5-40 mL  5-40 mL IntraVENous PRN  
 LORazepam (ATIVAN) tablet 1 mg  1 mg Oral Q1H PRN Or  
 LORazepam (ATIVAN) injection 1 mg  1 mg IntraVENous Q1H PRN  
 LORazepam (ATIVAN) tablet 2 mg  2 mg Oral Q1H PRN Or  
 LORazepam (ATIVAN) injection 2 mg  2 mg IntraVENous Q1H PRN  therapeutic multivitamin (THERAGRAN) tablet 1 Tab  1 Tab Oral DAILY  dextrose 5% and 0.9% NaCl infusion  75 mL/hr IntraVENous CONTINUOUS  
 sodium chloride (NS) flush 5-40 mL  5-40 mL IntraVENous Q8H  
 sodium chloride (NS) flush 5-40 mL  5-40 mL IntraVENous PRN  
 heparin (porcine) injection 5,000 Units  5,000 Units SubCUTAneous Q8H Labs:   
Recent Results (from the past 24 hour(s)) CBC WITH AUTOMATED DIFF Collection Time: 04/25/19  5:35 PM  
Result Value Ref Range WBC 9.4 4.6 - 13.2 K/uL  
 RBC 4.25 (L) 4.70 - 5.50 M/uL  
 HGB 13.2 13.0 - 16.0 g/dL HCT 39.0 36.0 - 48.0 % MCV 91.8 74.0 - 97.0 FL  
 MCH 31.1 24.0 - 34.0 PG  
 MCHC 33.8 31.0 - 37.0 g/dL  
 RDW 15.9 (H) 11.6 - 14.5 % PLATELET 912 895 - 228 K/uL MPV 10.6 9.2 - 11.8 FL  
 NEUTROPHILS 64 40 - 73 % LYMPHOCYTES 26 21 - 52 % MONOCYTES 8 3 - 10 % EOSINOPHILS 2 0 - 5 % BASOPHILS 0 0 - 2 %  
 ABS. NEUTROPHILS 6.0 1.8 - 8.0 K/UL  
 ABS. LYMPHOCYTES 2.5 0.9 - 3.6 K/UL  
 ABS. MONOCYTES 0.8 0.05 - 1.2 K/UL  
 ABS. EOSINOPHILS 0.2 0.0 - 0.4 K/UL  
 ABS. BASOPHILS 0.0 0.0 - 0.1 K/UL  
 DF AUTOMATED METABOLIC PANEL, COMPREHENSIVE Collection Time: 04/25/19  5:35 PM  
Result Value Ref Range Sodium 139 136 - 145 mmol/L Potassium 2.7 (LL) 3.5 - 5.5 mmol/L Chloride 103 100 - 108 mmol/L  
 CO2 27 21 - 32 mmol/L Anion gap 9 3.0 - 18 mmol/L Glucose 124 (H) 74 - 99 mg/dL BUN 4 (L) 7.0 - 18 MG/DL Creatinine 0.95 0.6 - 1.3 MG/DL  
 BUN/Creatinine ratio 4 (L) 12 - 20 GFR est AA >60 >60 ml/min/1.73m2 GFR est non-AA >60 >60 ml/min/1.73m2 Calcium 7.7 (L) 8.5 - 10.1 MG/DL Bilirubin, total 0.6 0.2 - 1.0 MG/DL  
 ALT (SGPT) 49 16 - 61 U/L  
 AST (SGOT) 77 (H) 15 - 37 U/L Alk. phosphatase 89 45 - 117 U/L Protein, total 7.0 6.4 - 8.2 g/dL Albumin 2.8 (L) 3.4 - 5.0 g/dL Globulin 4.2 (H) 2.0 - 4.0 g/dL A-G Ratio 0.7 (L) 0.8 - 1.7 ETHYL ALCOHOL Collection Time: 04/25/19  5:35 PM  
Result Value Ref Range ALCOHOL(ETHYL),SERUM 194 (H) 0 - 3 MG/DL URINALYSIS W/ RFLX MICROSCOPIC Collection Time: 04/25/19  9:50 PM  
Result Value Ref Range Color DARK YELLOW Appearance CLEAR Specific gravity 1.012 1.005 - 1.030    
 pH (UA) 6.0 5.0 - 8.0 Protein NEGATIVE  NEG mg/dL Glucose NEGATIVE  NEG mg/dL Ketone NEGATIVE  NEG mg/dL Bilirubin NEGATIVE  NEG Blood NEGATIVE  NEG Urobilinogen 1.0 0.2 - 1.0 EU/dL Nitrites NEGATIVE  NEG Leukocyte Esterase NEGATIVE  NEG    
DRUG SCREEN, URINE Collection Time: 04/25/19  9:50 PM  
Result Value Ref Range BENZODIAZEPINES POSITIVE (A) NEG    
 BARBITURATES NEGATIVE  NEG    
 THC (TH-CANNABINOL) NEGATIVE  NEG    
 OPIATES NEGATIVE  NEG    
 PCP(PHENCYCLIDINE) NEGATIVE  NEG    
 COCAINE NEGATIVE  NEG    
 AMPHETAMINES NEGATIVE  NEG METHADONE NEGATIVE  NEG HDSCOM (NOTE) METABOLIC PANEL, COMPREHENSIVE Collection Time: 04/26/19  7:41 AM  
Result Value Ref Range Sodium 142 136 - 145 mmol/L Potassium 4.6 3.5 - 5.5 mmol/L Chloride 110 (H) 100 - 108 mmol/L  
 CO2 28 21 - 32 mmol/L Anion gap 4 3.0 - 18 mmol/L Glucose 106 (H) 74 - 99 mg/dL BUN 4 (L) 7.0 - 18 MG/DL Creatinine 0.78 0.6 - 1.3 MG/DL  
 BUN/Creatinine ratio 5 (L) 12 - 20 GFR est AA >60 >60 ml/min/1.73m2 GFR est non-AA >60 >60 ml/min/1.73m2 Calcium 7.5 (L) 8.5 - 10.1 MG/DL Bilirubin, total 1.1 (H) 0.2 - 1.0 MG/DL  
 ALT (SGPT) 43 16 - 61 U/L  
 AST (SGOT) 80 (H) 15 - 37 U/L Alk.  phosphatase 82 45 - 117 U/L  
 Protein, total 6.3 (L) 6.4 - 8.2 g/dL Albumin 2.5 (L) 3.4 - 5.0 g/dL Globulin 3.8 2.0 - 4.0 g/dL A-G Ratio 0.7 (L) 0.8 - 1.7 MAGNESIUM Collection Time: 04/26/19  7:41 AM  
Result Value Ref Range Magnesium 1.7 1.6 - 2.6 mg/dL PHOSPHORUS Collection Time: 04/26/19  7:41 AM  
Result Value Ref Range Phosphorus 2.8 2.5 - 4.9 MG/DL Signed By: Gianfranco Bernal MD   
 April 26, 2019

## 2019-04-26 NOTE — ED PROVIDER NOTES
EMERGENCY DEPARTMENT HISTORY AND PHYSICAL EXAM 
 
8:09 PM 
 
 
Date: 4/25/2019 Patient Name: Lizette De Guzman History of Presenting Illness Chief Complaint Patient presents with  Alcohol Problem History Provided By: patient Additional History (Context): Lizette De Guzman is a 49 y.o. male with PMH of alcohol abuse presents with alcohol withdrawal with seizure activity. Patient went to see his primary care doctor who referred him to the emergency department to be admitted for withdrawal.  He states that he drinks 2 th per day and is unsure if he has an alcohol problem. He is tearful and appears intoxicated. PCP: Devin Ahn DO 
 
 
 
 
Current Facility-Administered Medications Medication Dose Route Frequency Provider Last Rate Last Dose  
 0.9% sodium chloride with KCl 20 mEq/L infusion   IntraVENous CONTINUOUS Markyaxel Ross MD      
 potassium chloride (KLOR-CON) packet for solution 40 mEq  40 mEq Oral NOW Markyaxel Ross MD      
 
Current Outpatient Medications Medication Sig Dispense Refill  lactulose (CHRONULAC) 10 gram/15 mL solution Take 30 mL by mouth three (3) times daily. 1 Bottle 0  
 LORazepam (ATIVAN) 1 mg tablet Take 1 Tab by mouth every eight (8) hours as needed for Anxiety. Max Daily Amount: 3 mg. 9 Tab 0  
 magnesium oxide (MAG-OX) 400 mg tablet Take 1 Tab by mouth daily. 30 Tab 0  
 naltrexone (DEPADE) 50 mg tablet Take 1 Tab by mouth daily. 30 Tab 3  
 multivit-mineral-iron-lutein (WOMEN'S CENTRUM SILVER WITH LUTEIN) tab tablet Take 1 Tab by mouth daily. 30 Tab 0  
 folic acid (FOLVITE) 1 mg tablet Take 1 Tab by mouth daily. 30 Tab 0  
 thiamine (B-1) 100 mg tablet Take 1 Tab by mouth daily. 30 Tab 0 Past History Past Medical History: 
Past Medical History:  
Diagnosis Date  Alcohol abuse Past Surgical History: No past surgical history on file. Family History: No family history on file. Social History: Social History Tobacco Use  Smoking status: Current Every Day Smoker Packs/day: 2.00  Smokeless tobacco: Never Used Substance Use Topics  Alcohol use: Yes Alcohol/week: 2.4 oz Types: 4 Cans of beer per week Comment: 12 beers a day-fifth bourbon  Drug use: No  
 
 
Allergies: Allergies Allergen Reactions  Amoxicillin Unknown (comments) Review of Systems Review of Systems Constitutional: Negative for chills and fever. Respiratory: Negative for shortness of breath. Cardiovascular: Negative for chest pain. Gastrointestinal: Negative for nausea and vomiting. All other systems reviewed and are negative. Physical Exam  
 
Visit Vitals /72 Pulse (!) 113 Temp 97.9 °F (36.6 °C) Resp 18 SpO2 97% Physical Exam  
Constitutional: He is oriented to person, place, and time. He appears well-developed and well-nourished. No distress. HENT:  
Head: Normocephalic and atraumatic. Eyes: Conjunctivae and EOM are normal. Right eye exhibits no discharge. Left eye exhibits no discharge. No scleral icterus. Neck: Normal range of motion. Neck supple. No tracheal deviation present. Cardiovascular: Normal rate, regular rhythm and normal heart sounds. No murmur heard. Pulmonary/Chest: Effort normal and breath sounds normal. No respiratory distress. He has no wheezes. He has no rales. Abdominal: Soft. He exhibits no distension. There is no tenderness. There is no rebound and no guarding. Musculoskeletal: Normal range of motion. He exhibits no edema or deformity. Neurological: He is alert and oriented to person, place, and time. No cranial nerve deficit. Skin: Skin is warm and dry. He is not diaphoretic. Psychiatric: His behavior is normal. Thought content normal. His affect is labile. His speech is slurred. He exhibits a depressed mood. Diagnostic Study Results Labs - Recent Results (from the past 12 hour(s)) CBC WITH AUTOMATED DIFF Collection Time: 04/25/19  5:35 PM  
Result Value Ref Range WBC 9.4 4.6 - 13.2 K/uL  
 RBC 4.25 (L) 4.70 - 5.50 M/uL  
 HGB 13.2 13.0 - 16.0 g/dL HCT 39.0 36.0 - 48.0 % MCV 91.8 74.0 - 97.0 FL  
 MCH 31.1 24.0 - 34.0 PG  
 MCHC 33.8 31.0 - 37.0 g/dL  
 RDW 15.9 (H) 11.6 - 14.5 % PLATELET 710 538 - 398 K/uL MPV 10.6 9.2 - 11.8 FL  
 NEUTROPHILS 64 40 - 73 % LYMPHOCYTES 26 21 - 52 % MONOCYTES 8 3 - 10 % EOSINOPHILS 2 0 - 5 % BASOPHILS 0 0 - 2 %  
 ABS. NEUTROPHILS 6.0 1.8 - 8.0 K/UL  
 ABS. LYMPHOCYTES 2.5 0.9 - 3.6 K/UL  
 ABS. MONOCYTES 0.8 0.05 - 1.2 K/UL  
 ABS. EOSINOPHILS 0.2 0.0 - 0.4 K/UL  
 ABS. BASOPHILS 0.0 0.0 - 0.1 K/UL  
 DF AUTOMATED METABOLIC PANEL, COMPREHENSIVE Collection Time: 04/25/19  5:35 PM  
Result Value Ref Range Sodium 139 136 - 145 mmol/L Potassium 2.7 (LL) 3.5 - 5.5 mmol/L Chloride 103 100 - 108 mmol/L  
 CO2 27 21 - 32 mmol/L Anion gap 9 3.0 - 18 mmol/L Glucose 124 (H) 74 - 99 mg/dL BUN 4 (L) 7.0 - 18 MG/DL Creatinine 0.95 0.6 - 1.3 MG/DL  
 BUN/Creatinine ratio 4 (L) 12 - 20 GFR est AA >60 >60 ml/min/1.73m2 GFR est non-AA >60 >60 ml/min/1.73m2 Calcium 7.7 (L) 8.5 - 10.1 MG/DL Bilirubin, total 0.6 0.2 - 1.0 MG/DL  
 ALT (SGPT) 49 16 - 61 U/L  
 AST (SGOT) 77 (H) 15 - 37 U/L Alk. phosphatase 89 45 - 117 U/L Protein, total 7.0 6.4 - 8.2 g/dL Albumin 2.8 (L) 3.4 - 5.0 g/dL Globulin 4.2 (H) 2.0 - 4.0 g/dL A-G Ratio 0.7 (L) 0.8 - 1.7 ETHYL ALCOHOL Collection Time: 04/25/19  5:35 PM  
Result Value Ref Range ALCOHOL(ETHYL),SERUM 194 (H) 0 - 3 MG/DL Radiologic Studies - No orders to display Medical Decision Making I am the first provider for this patient. I reviewed the vital signs, available nursing notes, past medical history, past surgical history, family history and social history. Vital Signs-Reviewed the patient's vital signs. Provider Notes (Medical Decision Making): Patient with alcohol withdrawal and seizure, will be admitted for further management. Librium and CIWA protocol has been initiated. Diagnosis Clinical Impression: 1. Alcohol withdrawal syndrome with complication ([facility] [address] 2. Seizure ([facility] Disposition: admit Follow-up Information None Patient's Medications Start Taking No medications on file Continue Taking FOLIC ACID (FOLVITE) 1 MG TABLET    Take 1 Tab by mouth daily. LACTULOSE (CHRONULAC) 10 GRAM/15 ML SOLUTION    Take 30 mL by mouth three (3) times daily. LORAZEPAM (ATIVAN) 1 MG TABLET    Take 1 Tab by mouth every eight (8) hours as needed for Anxiety. Max Daily Amount: 3 mg. MAGNESIUM OXIDE (MAG-OX) 400 MG TABLET    Take 1 Tab by mouth daily. MULTIVIT-MINERAL-IRON-LUTEIN (WOMEN'S CENTRUM SILVER WITH LUTEIN) TAB TABLET    Take 1 Tab by mouth daily. NALTREXONE (DEPADE) 50 MG TABLET    Take 1 Tab by mouth daily. THIAMINE (B-1) 100 MG TABLET    Take 1 Tab by mouth daily. These Medications have changed No medications on file Stop Taking No medications on file  
 
_______________________________ Jonathan Osuna MD 
_______________________________

## 2019-04-27 LAB
ALBUMIN SERPL-MCNC: 2 G/DL (ref 3.4–5)
ALBUMIN/GLOB SERPL: 0.5 {RATIO} (ref 0.8–1.7)
ALP SERPL-CCNC: 77 U/L (ref 45–117)
ALT SERPL-CCNC: 44 U/L (ref 16–61)
ANION GAP SERPL CALC-SCNC: 7 MMOL/L (ref 3–18)
AST SERPL-CCNC: 81 U/L (ref 15–37)
BASOPHILS # BLD: 0 K/UL (ref 0–0.1)
BASOPHILS NFR BLD: 1 % (ref 0–2)
BILIRUB SERPL-MCNC: 1.1 MG/DL (ref 0.2–1)
BUN SERPL-MCNC: 3 MG/DL (ref 7–18)
BUN/CREAT SERPL: 5 (ref 12–20)
CALCIUM SERPL-MCNC: 7.4 MG/DL (ref 8.5–10.1)
CHLORIDE SERPL-SCNC: 109 MMOL/L (ref 100–108)
CO2 SERPL-SCNC: 21 MMOL/L (ref 21–32)
CREAT SERPL-MCNC: 0.55 MG/DL (ref 0.6–1.3)
DIFFERENTIAL METHOD BLD: ABNORMAL
EOSINOPHIL # BLD: 0.1 K/UL (ref 0–0.4)
EOSINOPHIL NFR BLD: 3 % (ref 0–5)
ERYTHROCYTE [DISTWIDTH] IN BLOOD BY AUTOMATED COUNT: 16.3 % (ref 11.6–14.5)
FERRITIN SERPL-MCNC: 805 NG/ML (ref 8–388)
GLOBULIN SER CALC-MCNC: 4.2 G/DL (ref 2–4)
GLUCOSE SERPL-MCNC: 108 MG/DL (ref 74–99)
HCT VFR BLD AUTO: 35.5 % (ref 36–48)
HGB BLD-MCNC: 11.9 G/DL (ref 13–16)
LYMPHOCYTES # BLD: 1.9 K/UL (ref 0.9–3.6)
LYMPHOCYTES NFR BLD: 35 % (ref 21–52)
MCH RBC QN AUTO: 32.4 PG (ref 24–34)
MCHC RBC AUTO-ENTMCNC: 33.5 G/DL (ref 31–37)
MCV RBC AUTO: 96.7 FL (ref 74–97)
MONOCYTES # BLD: 0.4 K/UL (ref 0.05–1.2)
MONOCYTES NFR BLD: 8 % (ref 3–10)
NEUTS SEG # BLD: 2.9 K/UL (ref 1.8–8)
NEUTS SEG NFR BLD: 53 % (ref 40–73)
PLATELET # BLD AUTO: 125 K/UL (ref 135–420)
PMV BLD AUTO: 10.9 FL (ref 9.2–11.8)
POTASSIUM SERPL-SCNC: 3.9 MMOL/L (ref 3.5–5.5)
PROT SERPL-MCNC: 6.2 G/DL (ref 6.4–8.2)
RBC # BLD AUTO: 3.67 M/UL (ref 4.7–5.5)
SODIUM SERPL-SCNC: 137 MMOL/L (ref 136–145)
WBC # BLD AUTO: 5.4 K/UL (ref 4.6–13.2)

## 2019-04-27 PROCEDURE — 80053 COMPREHEN METABOLIC PANEL: CPT

## 2019-04-27 PROCEDURE — 65660000000 HC RM CCU STEPDOWN

## 2019-04-27 PROCEDURE — 36415 COLL VENOUS BLD VENIPUNCTURE: CPT

## 2019-04-27 PROCEDURE — 74011250637 HC RX REV CODE- 250/637: Performed by: HOSPITALIST

## 2019-04-27 PROCEDURE — 85025 COMPLETE CBC W/AUTO DIFF WBC: CPT

## 2019-04-27 PROCEDURE — 74011250637 HC RX REV CODE- 250/637: Performed by: INTERNAL MEDICINE

## 2019-04-27 PROCEDURE — 74011000258 HC RX REV CODE- 258: Performed by: HOSPITALIST

## 2019-04-27 PROCEDURE — 74011250636 HC RX REV CODE- 250/636: Performed by: HOSPITALIST

## 2019-04-27 PROCEDURE — 82728 ASSAY OF FERRITIN: CPT

## 2019-04-27 PROCEDURE — 80074 ACUTE HEPATITIS PANEL: CPT

## 2019-04-27 PROCEDURE — 74011250636 HC RX REV CODE- 250/636: Performed by: INTERNAL MEDICINE

## 2019-04-27 RX ADMIN — LORAZEPAM 1 MG: 1 TABLET ORAL at 10:00

## 2019-04-27 RX ADMIN — Medication 10 ML: at 22:28

## 2019-04-27 RX ADMIN — ACETAMINOPHEN 650 MG: 325 TABLET ORAL at 08:31

## 2019-04-27 RX ADMIN — HEPARIN SODIUM 5000 UNITS: 5000 INJECTION INTRAVENOUS; SUBCUTANEOUS at 22:20

## 2019-04-27 RX ADMIN — HEPARIN SODIUM 5000 UNITS: 5000 INJECTION INTRAVENOUS; SUBCUTANEOUS at 06:54

## 2019-04-27 RX ADMIN — LORAZEPAM 1 MG: 1 TABLET ORAL at 19:46

## 2019-04-27 RX ADMIN — HEPARIN SODIUM 5000 UNITS: 5000 INJECTION INTRAVENOUS; SUBCUTANEOUS at 14:28

## 2019-04-27 RX ADMIN — HYDRALAZINE HYDROCHLORIDE 10 MG: 20 INJECTION, SOLUTION INTRAMUSCULAR; INTRAVENOUS at 23:40

## 2019-04-27 RX ADMIN — FOLIC ACID 1 MG: 1 TABLET ORAL at 08:24

## 2019-04-27 RX ADMIN — THERA TABS 1 TABLET: TAB at 08:24

## 2019-04-27 RX ADMIN — DEXTROSE MONOHYDRATE AND SODIUM CHLORIDE 75 ML/HR: 5; .9 INJECTION, SOLUTION INTRAVENOUS at 00:14

## 2019-04-27 RX ADMIN — Medication 10 ML: at 14:29

## 2019-04-27 RX ADMIN — Medication 10 ML: at 14:28

## 2019-04-27 RX ADMIN — Medication 100 MG: at 08:24

## 2019-04-27 RX ADMIN — LORAZEPAM 1 MG: 1 TABLET ORAL at 08:30

## 2019-04-27 RX ADMIN — Medication 10 ML: at 07:05

## 2019-04-27 RX ADMIN — LORAZEPAM 1 MG: 1 TABLET ORAL at 22:22

## 2019-04-27 RX ADMIN — DEXTROSE MONOHYDRATE AND SODIUM CHLORIDE 75 ML/HR: 5; .9 INJECTION, SOLUTION INTRAVENOUS at 14:33

## 2019-04-27 RX ADMIN — ACETAMINOPHEN 650 MG: 325 TABLET ORAL at 22:21

## 2019-04-27 RX ADMIN — Medication 10 ML: at 22:30

## 2019-04-27 NOTE — PROGRESS NOTES
Pt on CIWA protocol, Ativan administered based on MD's order. Nicotine patch administered to right upper arm.

## 2019-04-27 NOTE — ROUTINE PROCESS
Bedside and Verbal shift change report given to [facility] (oncoming nurse) by Dana Kessler (offgoing nurse). Report included the following information Kardex, Intake/Output and MAR.
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
## 2019-04-27 NOTE — ROUTINE PROCESS
Bedside and Verbal shift change report given to [facility] (oncoming nurse) by Rai Burch (offgoing nurse). Report included the following information Kardex, Intake/Output and MAR.

## 2019-04-27 NOTE — PROGRESS NOTES
Received pt while asleep, eye open to voice and in no apparent discomfort, call light within pt's reach Pt demonstrate flight of ideas, unable to concentrate on a topic, he stated he wants to get help with his drinking habit; however, he rationalize his action, links it with family tradition/issues. Pt educated on abstinence from alcohol and cigarette smoking and informed about available resources which he acknowledges that he is aware about. 2350: Pack of cigarette with 18 sticks and lighter collected from pt and kept in the nursing station. Charge nurse, Jacqueline Shipman made aware. 4546: Bedside shift change report given to Rosa Diehl RN (oncoming nurse) by Vilma Darnell RN (offgoing nurse). Report included the following information SBAR, Procedure Summary, Intake/Output, MAR and Recent Results.

## 2019-04-27 NOTE — ROUTINE PROCESS
Bedside and Verbal shift change report given to Nuzhat Quiñonez RN (oncoming nurse) by Estelle Clement RN (offgoing nurse). Report included the following information Kardex, Intake/Output, MAR and Recent Results.

## 2019-04-27 NOTE — H&P
History & Physical 
Patient: Gaetano Oakley MRN: [id number]  CSN: [id number] YOB: 1965  Age: 49 y.o. Sex: male DOA: 4/25/2019 Chief Complaint:  
Chief Complaint Patient presents with  Alcohol Problem HPI:  
 
Gaetano Oakley is a 49 y.o.  male who has h/o alcohol abuse presented to the ER after possible seizure D/O he has loss of conscious lost track what happened to him . Pt reported that he shakes all the time and he drink liquor all day long. He used to drink beer but for the last 2 years he has been drinking liquor he drinks around 2 bottle  Every day. Pt was admitted to hospital service by mistakes and switch to our service Pt has been in [facility]  recently around one month ago and seen by Dr Elvira Sow for elevated liver enzymes had ct scan abdomen and ultrasound liver . pt has been f/u with Dr Clance Holter as outpatient Pt has been trying to get inpatient  reheab program and couldn't get in any close program at  [facility] due to Pt has h/o Anxiety and and has been drinking to help anxiety Pt started on IV fluid , thiamin and folic acid has no more seizure activity still shaking on and off has been on ativan alcohol withdrawal protocol. Ct abdomen and pelvis 3/26/19 Mild left hydronephrosis with abrupt narrowing at the ureteropelvic junction. No 
calculi seen. This may be related to recent stone passage, stricture or parapelvic cysts. This appears similar to prior exam. Hepatomegaly and hepatic steatosis. Ct head and neck 3/26 /19 1. Asymmetric white matter hypoattenuation involving the right corona radiata 
and external capsule. This likely reflects chronic ischemic change. Follow-up 
with nonemergent contrast enhanced brain MRI is recommended to exclude 
demyelinating disease or neoplastic process. 
  
2.  Otherwise, no acute intracranial abnormalities. Past Medical History:  
Diagnosis Date  Alcohol abuse No past surgical history 
 
 family history : mother had bipolar d/O and schizophrenia Social History Socioeconomic History  Marital status:  Spouse name: Not on file  Number of children: Not on file  Years of education: Not on file  Highest education level: Not on file Tobacco Use  Smoking status: Current Every Day Smoker Packs/day: 2.00  Smokeless tobacco: Never Used Substance and Sexual Activity  Alcohol use: Yes Alcohol/week: 2.4 oz Types: 4 Cans of beer per week Comment: 12 beers a day-fifth bourbon  Drug use: No  
 
 
Prior to Admission medications Medication Sig Start Date End Date Taking? Authorizing Provider LORazepam (ATIVAN) 1 mg tablet Take 1 Tab by mouth every eight (8) hours as needed for Anxiety. Max Daily Amount: 3 mg. 3/30/19  Yes Radha Renteria DO  
lactulose (CHRONULAC) 10 gram/15 mL solution Take 30 mL by mouth three (3) times daily. 3/30/19   Radha Renteria DO  
magnesium oxide (MAG-OX) 400 mg tablet Take 1 Tab by mouth daily. 1/18/19   Tracey Toledo MD  
naltrexone (DEPADE) 50 mg tablet Take 1 Tab by mouth daily. 1/17/19   Tracey Toledo MD  
multivit-mineral-iron-lutein [facility] CENTRUM SILVER WITH LUTEIN) tab tablet Take 1 Tab by mouth daily. 9/11/18   Raudel Chery MD  
folic acid (FOLVITE) 1 mg tablet Take 1 Tab by mouth daily. 9/11/18   Raudel Chery MD  
thiamine (B-1) 100 mg tablet Take 1 Tab by mouth daily. 9/11/18   Raudel Chery MD  
 
 
Allergies Allergen Reactions  Amoxicillin Unknown (comments) Review of Systems GENERAL: Patient alert, awake and oriented times 3, able to communicate full sentences and not in distress. HEENT: No change in vision, no earache, tinnitus, sore throat or sinus congestion. Poor dentition NECK: No pain or stiffness. CARDIOVASCULAR: No chest pain or pressure. No palpitations. PULMONARY: No shortness of breath, cough or wheeze. GASTROINTESTINAL: positive abdominal pain, no  nausea, vomiting or diarrhea, melena or       bright red blood per rectum. GENITOURINARY: No urinary frequency, urgency, hesitancy or dysuria. MUSCULOSKELETAL: No joint or muscle pain, no back pain, no recent trauma. Swelling Rt arm after IV infiltrated  DERMATOLOGIC: No rash, no itching, no lesions. ENDOCRINE: No polyuria, polydipsia, no heat or cold intolerance. No recent change in    weight. HEMATOLOGICAL: No anemia or easy bruising or bleeding. NEUROLOGIC: No headache,  Question seizures,  Positive numbness, tingling Lt hand   no weakness PSYCHIATRIC: No depression, positive anxiety  anxiety,  No mood disorder, no loss of interest in normal       activity or change in sleep pattern. Physical Exam:  
 
Physical Exam: 
Visit Vitals BP (!) 162/93 (BP 1 Location: Left arm, BP Patient Position: At rest) Pulse 78 Temp 98 °F (36.7 °C) Resp 18 Ht 5' 8\" (1.727 m) Wt 90.2 kg (198 lb 12.8 oz) SpO2 98% BMI 30.23 kg/m² O2 Device: Room air Temp (24hrs), Av °F (36.7 °C), Min:97.7 °F (36.5 °C), Max:98.4 °F (36.9 °C) No intake/output data recorded.  1901 -  0700 In: 60 [I.V.:60] Out: 740 [Urine:240] General:  Alert, cooperative, no distress, appears stated age. Head:  Normocephalic, without obvious abnormality, atraumatic. Eyes:  Conjunctivae/corneas clear. PERRL, EOMs intact. Nose: Nares normal. No drainage or sinus tenderness. Throat: Lips, mucosa, and tongue normal. Teeth and gums normal.  
Neck: Supple, symmetrical, trachea midline, no adenopathy, thyroid: no enlargement/tenderness/nodules, no carotid bruit and no JVD. Back:   ROM normal. No CVA tenderness. Lungs:   Clear to auscultation bilaterally. Chest wall:  No tenderness or deformity. Heart:  Regular rate and rhythm, S1, S2 normal, no murmur, click, rub or gallop. Abdomen: Soft,  Distended non-tender.  Bowel sounds normal. No masses, No organomegaly. Extremities: Extremities normal, atraumatic, no cyanosis or edema. Pulses: 2+ and symmetric all extremities. Skin: Skin color, texture, turgor normal.  Slight swelling Rt hand Neurologic: CNII-XII intact. No focal motor or sensory deficit. Labs Reviewed: All lab results for the last 24 hours reviewed. CXR and EKG Procedures/imaging: see electronic medical records for all procedures/Xrays and details which were not copied into this note but were reviewed prior to creation of Plan Assessment/Plan Active Problems: 
  Alcohol withdrawal ([facility] [address] (3/26/2019) Alcohol abuse (4/25/2019) Hypokalemia (4/25/2019) Plan: 
Seizure most like due to alcohol withdrawal  
Continue IV hydration Alcohol  AVISWA withdrwal protocol with ativan Neurology consult Anxiety/ family history of schizophrenia and Bipolar Psychiatry consult Alcohol abuse and dependent Case manger consult  For further evaluation inpatient rehab Continue thiamin and folic acid Tobacco abuse and dependence Nicotine patch Elevated Liver enzymes/ thrombocytopenia/ H/o Pancreatitis Monitor PLT count Check ultrasound liver Hepatitis screen , ferritin level Check lipase level 
l Hypokalemia Resolved  
continue monitor electrolyte Cbc, cmp, mag Pt and ot discussed with dr Kiara Johnson and Dr Jesus Guerrero  For consult DVT/GI Prophylaxis: Hep SQ and H2B/PPI Time for admission discussion with hospitalist review record and documentation discussed with neurology and psychiatry consult more than 65 minutes Prbahu Mishra MD 
4/27/2019 
10:44 AM

## 2019-04-27 NOTE — CONSULTS
NEUROLOGY CONSULT NOTE    Patient ID:  Polly Charles  [id number]  89 y.o.  1965    Date of Consultation:  April 27, 2019    Referring Physician: Rolf Negro MD    Reason for Consultation:  Alcohol dependency         Subjective:       History of Present Illness: This is a 48 yo man with hx of alcohol dependency who was admitted on 25 April 2019 when he presented intoxicated and asked to be admitted because he tried in the past too quit drinking and had withdraw seizures. He drinks 1-2 bottles of vodka per day. States that in the morning has to have his alcohol because otherwise feels anxious, his hands will become  Tremulous and the alcohol helps with those. .       Patient Active Problem List    Diagnosis Date Noted    Alcohol abuse 04/25/2019    Hypokalemia 04/25/2019    Alcohol withdrawal ([facility] 03/26/2019    Alcohol withdrawal syndrome with complication ([facility] 62/98/7177    Pancreatitis 09/08/2018    Alcohol dependence ([facility] 10/05/2017     Past Medical History:   Diagnosis Date    Alcohol abuse       No past surgical history on file. Prior to Admission medications    Medication Sig Start Date End Date Taking? Authorizing Provider   LORazepam (ATIVAN) 1 mg tablet Take 1 Tab by mouth every eight (8) hours as needed for Anxiety. Max Daily Amount: 3 mg. 3/30/19  Yes Diana Baer DO   lactulose (CHRONULAC) 10 gram/15 mL solution Take 30 mL by mouth three (3) times daily. 3/30/19   Diana Baer DO   magnesium oxide (MAG-OX) 400 mg tablet Take 1 Tab by mouth daily. 1/18/19   Nabila Barger MD   naltrexone (DEPADE) 50 mg tablet Take 1 Tab by mouth daily. 1/17/19   Nabila Barger MD   multivit-mineral-iron-lutein [facility] CENTRUM SILVER WITH LUTEIN) tab tablet Take 1 Tab by mouth daily. 9/11/18   Arash Gallardo MD   folic acid (FOLVITE) 1 mg tablet Take 1 Tab by mouth daily. 9/11/18   Arash Gallardo MD   thiamine (B-1) 100 mg tablet Take 1 Tab by mouth daily.  9/11/18   Arash Gallardo MD Allergies   Allergen Reactions    Amoxicillin Unknown (comments)      Social History     Tobacco Use    Smoking status: Current Every Day Smoker     Packs/day: 2.00    Smokeless tobacco: Never Used   Substance Use Topics    Alcohol use: Yes     Alcohol/week: 2.4 oz     Types: 4 Cans of beer per week     Comment: 12 beers a day-fifth bourbon      No family history on file. Review of Systems    Constitutional: No recent weight change, fever,fatigue, sleep difficulties, or loss of appetite. ENT/Mouth:  No hearing loss, ringing in the ears, chronic sinus problem, nose bleeds sore throat, voice change, hoarseness, swollen glands in neck, or difficulties with chewing and swallowing. Cardiovascular:  No chest pain/angina pectoris, palpitations,swelling of feet/ankles/hands, or calf pain while walking. Respiratory: No chronic or frequent coughs, spitting up blood, shortness of breath, asthma, or wheezing. Gastrointestinal: No abdominal pain, heartburn, nausea, vomiting, constipation, frequent diarrhea, rectal bleeding, or blood in stool. Genitourinary: No frequent urination, burning or painful urination, blood in urine, incontinence or dribbling. Musculoskeletal:   No joint pain, stiffness/swelling, weakness of muscles, muscle pain/cramp, or back pain. Integument:   No rash/itching, change in skin color, change in hair/nails, or change in color/size of moles. Neurological:  No dizziness/vertigo, loss of consciousness, numbness/tingling sensation, tremors, weakness in limbs, difficulty with balance, frequent or recurring headaches, memory loss or confusion. Psychiatric:   No nervousness, depression, hallucinations, paranoia or suspiciousness. Endocrine: No excessive thirst or urination, heat or cold intolerance. Hematologic/Lymphatic: No bleeding/bruising tendency, phlebitis, or past transfusion.        Objective:     Patient Vitals for the past 8 hrs:   BP Temp Pulse Resp SpO2   04/27/19 1058 (!) 152/93 97.3 °F (36.3 °C) 79 20 97 %   04/27/19 0752 (!) 162/93 98 °F (36.7 °C) 78 18 98 %       General Exam  No acute distress, normal body habitus    HEENT: Normocephalic, atraumatic, Sclera anicteric, normal conjunctiva  Mucous membranes: normal color and hydration     CV: No carotid bruits,   Heart: regular to rate and rhythm. No murmurs     Neurologic Exam:    Mental status:  Alert, oriented to person, place, time and circumstance  No visual spatial neglect or overt apraxia  Language: normal fluency and comprehension    Cranial nerves: PERRL, Extraocular movements intact and full, face sensation is intact bilat , face symmetric to movement, Tongue midline with normal strength, palat symmetric    Motor: strength 5/5 throughout  No abnormal movements    Coordination: Normal finger-nose-finger, Normal rapid alternating movements    DTRs (R/L)  Biceps: (2/2)  Brachorad (2/2)  Triceps: (2/2)   Patellar (2/2)  Ankles (2/2)      Sensation: Intact and symmetric to light touch, temperature and vibratory sense. Romberg sways. Gait: normal native gait, stands up with no use of hands. Data Review:    Recent Results (from the past 24 hour(s))   CBC WITH AUTOMATED DIFF    Collection Time: 04/27/19  4:50 AM   Result Value Ref Range    WBC 5.4 4.6 - 13.2 K/uL    RBC 3.67 (L) 4.70 - 5.50 M/uL    HGB 11.9 (L) 13.0 - 16.0 g/dL    HCT 35.5 (L) 36.0 - 48.0 %    MCV 96.7 74.0 - 97.0 FL    MCH 32.4 24.0 - 34.0 PG    MCHC 33.5 31.0 - 37.0 g/dL    RDW 16.3 (H) 11.6 - 14.5 %    PLATELET 659 (L) 732 - 420 K/uL    MPV 10.9 9.2 - 11.8 FL    NEUTROPHILS 53 40 - 73 %    LYMPHOCYTES 35 21 - 52 %    MONOCYTES 8 3 - 10 %    EOSINOPHILS 3 0 - 5 %    BASOPHILS 1 0 - 2 %    ABS. NEUTROPHILS 2.9 1.8 - 8.0 K/UL    ABS. LYMPHOCYTES 1.9 0.9 - 3.6 K/UL    ABS. MONOCYTES 0.4 0.05 - 1.2 K/UL    ABS. EOSINOPHILS 0.1 0.0 - 0.4 K/UL    ABS.  BASOPHILS 0.0 0.0 - 0.1 K/UL    DF AUTOMATED     METABOLIC PANEL, COMPREHENSIVE    Collection Time: 04/27/19  4:50 AM   Result Value Ref Range    Sodium 137 136 - 145 mmol/L    Potassium 3.9 3.5 - 5.5 mmol/L    Chloride 109 (H) 100 - 108 mmol/L    CO2 21 21 - 32 mmol/L    Anion gap 7 3.0 - 18 mmol/L    Glucose 108 (H) 74 - 99 mg/dL    BUN 3 (L) 7.0 - 18 MG/DL    Creatinine 0.55 (L) 0.6 - 1.3 MG/DL    BUN/Creatinine ratio 5 (L) 12 - 20      GFR est AA >60 >60 ml/min/1.73m2    GFR est non-AA >60 >60 ml/min/1.73m2    Calcium 7.4 (L) 8.5 - 10.1 MG/DL    Bilirubin, total 1.1 (H) 0.2 - 1.0 MG/DL    ALT (SGPT) 44 16 - 61 U/L    AST (SGOT) 81 (H) 15 - 37 U/L    Alk. phosphatase 77 45 - 117 U/L    Protein, total 6.2 (L) 6.4 - 8.2 g/dL    Albumin 2.0 (L) 3.4 - 5.0 g/dL    Globulin 4.2 (H) 2.0 - 4.0 g/dL    A-G Ratio 0.5 (L) 0.8 - 1.7           Radiology studies:       Assessment: This is a 48 yo man with alcohol dependency; his symptoms of anxiety, tremors, insomnia are consistent with minor withdraw symptoms; I would not be surprised if he had a an withdraw seizure if he was without alcohol for 12-48 hrs. DT can occur 48-96 hrs after last drink and symptoms are: hallucinations, disorientation, tachycardia, hypertension, hyperthermia, agitation, and diaphoresis - not detected by me at this time.        Active Problems:    Alcohol withdrawal ([facility] (3/26/2019)      Alcohol abuse (4/25/2019)      Hypokalemia (4/25/2019)        Plan:     - he needs help stop drinking and continue to monitor for at list 96 hrs for DT  - continue [facility] protocol  - no other recs, will sign off now please call me if any questions           Murray Whitmore MD  Adult Neurologist  4/27/2019

## 2019-04-28 LAB
ALBUMIN SERPL-MCNC: 2.4 G/DL (ref 3.4–5)
ALBUMIN/GLOB SERPL: 0.6 {RATIO} (ref 0.8–1.7)
ALP SERPL-CCNC: 75 U/L (ref 45–117)
ALT SERPL-CCNC: 49 U/L (ref 16–61)
AMMONIA PLAS-SCNC: 44 UMOL/L (ref 11–32)
ANION GAP SERPL CALC-SCNC: 8 MMOL/L (ref 3–18)
AST SERPL-CCNC: 85 U/L (ref 15–37)
BASOPHILS # BLD: 0 K/UL (ref 0–0.1)
BASOPHILS NFR BLD: 0 % (ref 0–2)
BILIRUB SERPL-MCNC: 0.8 MG/DL (ref 0.2–1)
BUN SERPL-MCNC: 3 MG/DL (ref 7–18)
BUN/CREAT SERPL: 6 (ref 12–20)
CALCIUM SERPL-MCNC: 7.8 MG/DL (ref 8.5–10.1)
CHLORIDE SERPL-SCNC: 107 MMOL/L (ref 100–108)
CO2 SERPL-SCNC: 26 MMOL/L (ref 21–32)
CREAT SERPL-MCNC: 0.54 MG/DL (ref 0.6–1.3)
DIFFERENTIAL METHOD BLD: ABNORMAL
EOSINOPHIL # BLD: 0.2 K/UL (ref 0–0.4)
EOSINOPHIL NFR BLD: 3 % (ref 0–5)
ERYTHROCYTE [DISTWIDTH] IN BLOOD BY AUTOMATED COUNT: 15.9 % (ref 11.6–14.5)
GLOBULIN SER CALC-MCNC: 3.7 G/DL (ref 2–4)
GLUCOSE SERPL-MCNC: 110 MG/DL (ref 74–99)
HCT VFR BLD AUTO: 34.5 % (ref 36–48)
HGB BLD-MCNC: 11.7 G/DL (ref 13–16)
LIPASE SERPL-CCNC: 286 U/L (ref 73–393)
LYMPHOCYTES # BLD: 1.9 K/UL (ref 0.9–3.6)
LYMPHOCYTES NFR BLD: 31 % (ref 21–52)
MAGNESIUM SERPL-MCNC: 1.3 MG/DL (ref 1.6–2.6)
MCH RBC QN AUTO: 32.1 PG (ref 24–34)
MCHC RBC AUTO-ENTMCNC: 33.9 G/DL (ref 31–37)
MCV RBC AUTO: 94.5 FL (ref 74–97)
MONOCYTES # BLD: 0.4 K/UL (ref 0.05–1.2)
MONOCYTES NFR BLD: 7 % (ref 3–10)
NEUTS SEG # BLD: 3.6 K/UL (ref 1.8–8)
NEUTS SEG NFR BLD: 59 % (ref 40–73)
PLATELET # BLD AUTO: 138 K/UL (ref 135–420)
PMV BLD AUTO: 11.4 FL (ref 9.2–11.8)
POTASSIUM SERPL-SCNC: 3.2 MMOL/L (ref 3.5–5.5)
PROT SERPL-MCNC: 6.1 G/DL (ref 6.4–8.2)
RBC # BLD AUTO: 3.65 M/UL (ref 4.7–5.5)
SODIUM SERPL-SCNC: 141 MMOL/L (ref 136–145)
WBC # BLD AUTO: 6.2 K/UL (ref 4.6–13.2)

## 2019-04-28 PROCEDURE — 74011250636 HC RX REV CODE- 250/636: Performed by: FAMILY MEDICINE

## 2019-04-28 PROCEDURE — 80053 COMPREHEN METABOLIC PANEL: CPT

## 2019-04-28 PROCEDURE — 74011250637 HC RX REV CODE- 250/637: Performed by: INTERNAL MEDICINE

## 2019-04-28 PROCEDURE — 74011250637 HC RX REV CODE- 250/637: Performed by: FAMILY MEDICINE

## 2019-04-28 PROCEDURE — 74011250636 HC RX REV CODE- 250/636: Performed by: INTERNAL MEDICINE

## 2019-04-28 PROCEDURE — 85025 COMPLETE CBC W/AUTO DIFF WBC: CPT

## 2019-04-28 PROCEDURE — 65660000000 HC RM CCU STEPDOWN

## 2019-04-28 PROCEDURE — 83690 ASSAY OF LIPASE: CPT

## 2019-04-28 PROCEDURE — 82140 ASSAY OF AMMONIA: CPT

## 2019-04-28 PROCEDURE — 74011000258 HC RX REV CODE- 258: Performed by: HOSPITALIST

## 2019-04-28 PROCEDURE — 97165 OT EVAL LOW COMPLEX 30 MIN: CPT

## 2019-04-28 PROCEDURE — 74011250637 HC RX REV CODE- 250/637: Performed by: HOSPITALIST

## 2019-04-28 PROCEDURE — 36415 COLL VENOUS BLD VENIPUNCTURE: CPT

## 2019-04-28 PROCEDURE — 83735 ASSAY OF MAGNESIUM: CPT

## 2019-04-28 RX ORDER — MAGNESIUM SULFATE HEPTAHYDRATE 40 MG/ML
2 INJECTION, SOLUTION INTRAVENOUS ONCE
Status: COMPLETED | OUTPATIENT
Start: 2019-04-28 | End: 2019-04-28

## 2019-04-28 RX ORDER — POTASSIUM CHLORIDE 1.5 G/1.77G
20 POWDER, FOR SOLUTION ORAL DAILY
Status: DISCONTINUED | OUTPATIENT
Start: 2019-04-29 | End: 2019-04-30

## 2019-04-28 RX ORDER — DOXEPIN HYDROCHLORIDE 10 MG/1
10 CAPSULE ORAL EVERY EVENING
Status: DISCONTINUED | OUTPATIENT
Start: 2019-04-28 | End: 2019-05-01 | Stop reason: HOSPADM

## 2019-04-28 RX ORDER — POTASSIUM CHLORIDE 1.5 G/1.77G
40 POWDER, FOR SOLUTION ORAL
Status: COMPLETED | OUTPATIENT
Start: 2019-04-28 | End: 2019-04-28

## 2019-04-28 RX ADMIN — DEXTROSE MONOHYDRATE AND SODIUM CHLORIDE 75 ML/HR: 5; .9 INJECTION, SOLUTION INTRAVENOUS at 05:19

## 2019-04-28 RX ADMIN — POTASSIUM CHLORIDE 40 MEQ: 1.5 POWDER, FOR SOLUTION ORAL at 12:57

## 2019-04-28 RX ADMIN — DOXEPIN HYDROCHLORIDE 10 MG: 10 CAPSULE ORAL at 22:25

## 2019-04-28 RX ADMIN — HEPARIN SODIUM 5000 UNITS: 5000 INJECTION INTRAVENOUS; SUBCUTANEOUS at 15:19

## 2019-04-28 RX ADMIN — LORAZEPAM 1 MG: 1 TABLET ORAL at 10:39

## 2019-04-28 RX ADMIN — DEXTROSE MONOHYDRATE AND SODIUM CHLORIDE 75 ML/HR: 5; .9 INJECTION, SOLUTION INTRAVENOUS at 19:55

## 2019-04-28 RX ADMIN — ACETAMINOPHEN 650 MG: 325 TABLET ORAL at 05:05

## 2019-04-28 RX ADMIN — LORAZEPAM 1 MG: 1 TABLET ORAL at 16:35

## 2019-04-28 RX ADMIN — LORAZEPAM 1 MG: 1 TABLET ORAL at 22:25

## 2019-04-28 RX ADMIN — FOLIC ACID 1 MG: 1 TABLET ORAL at 10:39

## 2019-04-28 RX ADMIN — Medication 10 ML: at 05:21

## 2019-04-28 RX ADMIN — Medication 10 ML: at 15:17

## 2019-04-28 RX ADMIN — Medication 10 ML: at 05:20

## 2019-04-28 RX ADMIN — Medication 10 ML: at 22:26

## 2019-04-28 RX ADMIN — HEPARIN SODIUM 5000 UNITS: 5000 INJECTION INTRAVENOUS; SUBCUTANEOUS at 05:15

## 2019-04-28 RX ADMIN — Medication 100 MG: at 10:39

## 2019-04-28 RX ADMIN — HEPARIN SODIUM 5000 UNITS: 5000 INJECTION INTRAVENOUS; SUBCUTANEOUS at 22:25

## 2019-04-28 RX ADMIN — LORAZEPAM 1 MG: 1 TABLET ORAL at 02:23

## 2019-04-28 RX ADMIN — LORAZEPAM 1 MG: 1 TABLET ORAL at 05:05

## 2019-04-28 RX ADMIN — THERA TABS 1 TABLET: TAB at 10:39

## 2019-04-28 RX ADMIN — MAGNESIUM SULFATE HEPTAHYDRATE 2 G: 40 INJECTION, SOLUTION INTRAVENOUS at 15:16

## 2019-04-28 NOTE — PROGRESS NOTES
Problem: Alcohol Withdrawal 
Goal: *STG: Participates in treatment plan Outcome: Progressing Towards Goal 
Goal: *STG: Remains safe in hospital 
Outcome: Progressing Towards Goal 
Goal: *STG: Seeks staff when symptoms of withdrawal increase Outcome: Progressing Towards Goal 
Goal: *STG: Complies with medication therapy Outcome: Progressing Towards Goal 
Goal: *STG: Attends activities and groups Outcome: Progressing Towards Goal 
Goal: *STG: Will identify negative impact of chemical dependency including the use of tobacco, alcohol, and other substances Outcome: Progressing Towards Goal 
Goal: *STG: Verbalizes abstinence as an achievable goal 
Outcome: Progressing Towards Goal 
Goal: *STG: Agrees to participate in outpatient after care program to support ongoing mental health Outcome: Progressing Towards Goal 
Goal: *STG: Able to indentify relapse triggers including interpersonal/social and familial factors Outcome: Progressing Towards Goal 
Goal: *STG: Identify lifestyle changes to support long term sobriety such as vocation, employment, education, and legal issues Outcome: Progressing Towards Goal 
Goal: *STG: Maintains appropriate nutrition and hydration Outcome: Progressing Towards Goal 
Goal: *STG: Vital signs within defined limits Outcome: Progressing Towards Goal 
Goal: *STG/LTG: Relapse prevention plan in place to include housing/aftercare, leisure activities, and spirituality Outcome: Progressing Towards Goal 
Goal: Interventions Outcome: Progressing Towards Goal 
  
Problem: Patient Education: Go to Patient Education Activity Goal: Patient/Family Education Outcome: Progressing Towards Goal 
  
Problem: Falls - Risk of 
Goal: *Absence of Falls Description Document Durenda Jean Paul Fall Risk and appropriate interventions in the flowsheet. Outcome: Progressing Towards Goal 
  
Problem: Patient Education: Go to Patient Education Activity Goal: Patient/Family Education Outcome: Progressing Towards Goal 
  
Problem: Pain Goal: *Control of Pain Outcome: Progressing Towards Goal 
Goal: *PALLIATIVE CARE:  Alleviation of Pain Outcome: Progressing Towards Goal 
  
Problem: Patient Education: Go to Patient Education Activity Goal: Patient/Family Education Outcome: Progressing Towards Goal

## 2019-04-28 NOTE — PROGRESS NOTES
Notified by CNA patient's bp 158/96. Patient's bp rechecked 147/102. Administered prn hydralazine 10 mg IV per order. Patient's bp reassessed 139/83. Will continue to monitor patient.

## 2019-04-28 NOTE — PROGRESS NOTES
Received report on pt.from off going RN. Resting quietly in bed on rounds. Sleeping w/o noted distress. No acute distress noted. Call bell at side. Will cont to monitor for any changes in status. 0830 awake, sitting up at side of the bed eating. No s/s of ETOH withdrawal. sts he feels a little anxious and some slight visible tremors present. Will cont to monitor for changes.

## 2019-04-28 NOTE — ROUTINE PROCESS
Bedside and Verbal shift change report given to [address] [place] [address] (oncoming nurse) by Kacey Nunez RN and Spencer Pacheco RN (offgoing nurse). Report included the following information SBAR, Kardex, Intake/Output, MAR and Recent Results.

## 2019-04-28 NOTE — PROGRESS NOTES
Problem: Self Care Deficits Care Plan (Adult) Goal: *Acute Goals and Plan of Care (Insert Text) Outcome: Resolved/Met OCCUPATIONAL THERAPY EVALUATION/DISCHARGE Patient: Gina Wells (89 y.o. male) Date: 4/28/2019 Primary Diagnosis: Alcohol withdrawal ([facility] [facility] [F10.239] Alcohol withdrawal ([facility] [facility] [F10.239] Precautions: Fall PLOF: Patient reported he lived alone PTA and was independent in all self-care activities. Patient reported having a son who checks in on him several times per week. ASSESSMENT AND RECOMMENDATIONS: 
Based on the objective data described below, the patient presents with functional AROM and strength of BUEs, good trunk mobility, and good dynamic sitting balance/static standing balance for participation in self-care tasks. Patient demonstrates slightly reduced dynamic standing balance 2/2 tremors. Will defer to PT. Educated patient on having supervision for bathroom mobility at this time. Patient may benefit from community-based or outpatient OT services to address IADL, leisure, and social participation skills. Patient shows good ability to complete self-care tasks and does not require acute care OT at this time. Skilled occupational therapy is not indicated at this time. Discharge Recommendations: Community-Based OT Further Equipment Recommendations for Discharge: N/A   
 
SUBJECTIVE:  
Patient stated ? I do everything for myself. ? OBJECTIVE DATA SUMMARY:  
 
Past Medical History:  
Diagnosis Date Alcohol abuse No past surgical history on file. Barriers to Learning/Limitations: no 
Compensate with: visual, verbal, tactile, kinesthetic cues/model Home Situation:  
Home Situation Home Environment: Private residence # Steps to Enter: 5 One/Two Story Residence: One story Living Alone: Yes Support Systems: Child(quinton) Patient Expects to be Discharged to[de-identified] Private residence Current DME Used/Available at Home: None ?     Right hand dominant   ? Left hand dominant Cognitive/Behavioral Status: 
Neurologic State: Alert Orientation Level: Oriented X4 Cognition: Follows commands;Decreased attention/concentration Skin: Intact in BUEs Edema: No edema noted in BUEs Vision/Perceptual: WFL Coordination: BUE Coordination: Within functional limits Balance: 
Sitting: Intact Standing: Impaired; Without support(2/2 tremors) Standing - Static: Good Standing - Dynamic : Fair;Unsupported Strength: BUE Strength: Within functional limits Tone & Sensation: BUE Tone: Normal 
Sensation: Intact Range of Motion: BUE 
AROM: Generally decreased, functional 
Functional Mobility and Transfers for ADLs: 
Bed Mobility: 
Rolling: Modified independent Supine to Sit: Modified independent Sit to Supine: Modified independent Scooting: Modified independent Transfers: 
Sit to Stand: Modified independent Stand to Sit: Modified Independent Bathroom Mobility: Modified independent;Supervision/set up(requires supervision 2/2 tremors) Simulated. ADL Assessment: 
Feeding: Modified independent Oral Facial Hygiene/Grooming: Modified Independent Bathing: Supervision Upper Body Dressing: Modified independent Lower Body Dressing: Supervision Toileting: Supervision ADL Intervention: 
Educated patient on role of OT in acute care setting vs. Community-based. Educated patient on availability of support groups and leisure activities. Patient  verbalized understanding and willingness to try community resources. Pain: 
Pain level pre-treatment: 0/10 Pain level post-treatment: 0/10 Activity Tolerance:  
Good Please refer to the flowsheet for vital signs taken during this treatment. After treatment:  
?  Patient left in no apparent distress sitting up in chair ? Patient left in no apparent distress in bed 
? Call bell left within reach ? Nursing notified ? Caregiver present ? Bed alarm activated COMMUNICATION/EDUCATION:  
?      Role of Occupational Therapy in the acute care setting 
? Home safety education was provided and the patient/caregiver indicated understanding. ? Patient/family have participated as able and agree with findings and recommendations. ?      Patient is unable to participate in plan of care at this time. Thank you for this referral. 
Dc Ascencio OTR/L Time Calculation: 15 mins Eval Complexity: History: LOW Complexity : Brief history review ; Examination: LOW Complexity : 1-3 performance deficits relating to physical, cognitive , or psychosocial skils that result in activity limitations and / or participation restrictions ; Decision Making:LOW Complexity : No comorbidities that affect functional and no verbal or physical assistance needed to complete eval tasks

## 2019-04-28 NOTE — PROGRESS NOTES
Progress Note Patient: Zulema Oro MRN: [id number]  CSN: [id number] YOB: 1965  Age: 49 y.o. Sex: male DOA: 4/25/2019 LOS:  LOS: 3 days Subjective:  
Pt has neurology and psychiatry consult yesterday . Discussed with neurology Dr Austen Harper pt doesn't need seizure med he will need inpatient rehab and medication management . Pt seen by Dr James Mccollum psychiatry recommended avoid Benzo and start Doxepin 10 mg qhs 
 
Pt is feeling better today still on IV fluid and ativan prn Dicussed  with case  manger  Will try to get inpatinet rehab in the area Ultrasound abdomen pending Mag 1.3 potassium 3.2 HPI 
  
Zulema Oro is a 49 y.o.  male who has h/o alcohol abuse presented to the ER after possible seizure D/O he has loss of conscious lost track what happened to him . Pt reported that he shakes all the time and he drink liquor all day long. He used to drink beer but for the last 2 years he has been drinking liquor he drinks around 2 bottle  Every day. 
  
Pt was admitted to hospital service by mistakes and switch to our service the next day 
  
Pt has been in [facility]  recently around one month ago and seen by Dr Layla Olivas for elevated liver enzymes had ct scan abdomen and ultrasound liver . 
  
 pt has been f/u with Dr Joy Lugo as outpatient at our office . Pt has been trying to get inpatient  reheab program and couldn't get in any close program at  [facility] due to  
  
Pt has h/o Anxiety and and has been drinking to help anxiety  
  
Pt started on IV fluid , thiamin and folic acid has no more seizure activity still shaking on and off has been on ativan alcohol withdrawal protocol. 
  
  
Ct abdomen and pelvis 3/26/19 Mild left hydronephrosis with abrupt narrowing at the ureteropelvic junction. No 
calculi seen.  This may be related to recent stone passage, stricture or parapelvic cysts. This appears similar to prior exam. Hepatomegaly and hepatic steatosis. 
  
Ct head and neck 3/26 /19 1.  Asymmetric white matter hypoattenuation involving the right corona radiata 
and external capsule. This likely reflects chronic ischemic change. Follow-up 
with nonemergent contrast enhanced brain MRI is recommended to exclude 
demyelinating disease or neoplastic process. 
  
2.  Otherwise, no acute intracranial abnormalities. Chief Complaint:  
Chief Complaint Patient presents with  Alcohol Problem Review of systems General: No fevers or chills. Cardiovascular: No chest pain or pressure. No palpitations. Pulmonary: No shortness of breath, cough or wheeze. Gastrointestinal: No abdominal pain, nausea, vomiting or diarrhea. Genitourinary: No urinary frequency, urgency, hesitancy or dysuria. Musculoskeletal: No joint or muscle pain, no back pain, no recent trauma. Neurologic: No headache, question seizure on admission no further seizure Objective:  
 
Physical Exam: 
Visit Vitals BP (!) 134/91 (BP 1 Location: Right arm, BP Patient Position: At rest) Pulse 88 Temp 97.6 °F (36.4 °C) Resp 18 Ht 5' 8\" (1.727 m) Wt 88.6 kg (195 lb 6.4 oz) SpO2 98% BMI 29.71 kg/m² General:         Alert, cooperative, no acute distress   
HEENT: NC, Atraumatic. PERRLA, anicteric sclerae. Lungs: CTA Bilaterally. No Wheezing/Rhonchi/Rales. Heart:  Regular  rhythm,  No murmur, No Rubs, No Gallops Abdomen: Soft, Non distended, Non tender.  +Bowel sounds, no HSM Extremities: No lower limb edema Psych:    Not anxious or agitated. Neurologic:  CN 2-12 grossly intact, Alert and oriented X 3. No acute neurological       
                         Deficits, Intake and Output: 
Current Shift:  No intake/output data recorded. Last three shifts:  04/26 1901 - 04/28 0700 In: 2100 [P.O.:1200; I.V.:900] Out: 1390 [Urine:890] Labs: Results: Chemistry Recent Labs  
  04/28/19 
1537 04/27/19 
0450 04/26/19 
0741 * 108* 106*  137 142  
K 3.2* 3.9 4.6  109* 110* CO2 26 21 28 BUN 3* 3* 4*  
CREA 0.54* 0.55* 0.78  
CA 7.8* 7.4* 7.5* AGAP 8 7 4 BUCR 6* 5* 5* AP 75 77 82  
TP 6.1* 6.2* 6.3* ALB 2.4* 2.0* 2.5*  
GLOB 3.7 4.2* 3.8 AGRAT 0.6* 0.5* 0.7* CBC w/Diff Recent Labs  
  04/28/19 
0347 04/27/19 
0450 04/25/19 
1735 WBC 6.2 5.4 9.4  
RBC 3.65* 3.67* 4.25* HGB 11.7* 11.9* 13.2 HCT 34.5* 35.5* 39.0  125* 166 GRANS 59 53 64 LYMPH 31 35 26 EOS 3 3 2 Cardiac Enzymes No results for input(s): CPK, CKND1, ASYA in the last 72 hours. No lab exists for component: [place] Coagulation No results for input(s): PTP, INR, APTT in the last 72 hours. No lab exists for component: INREXT Lipid Panel No results found for: CHOL, CHOLPOCT, CHOLX, CHLST, CHOLV, 846753, HDL, LDL, LDLC, DLDLP, 046055, VLDLC, VLDL, TGLX, TRIGL, TRIGP, TGLPOCT, CHHD, CHHDX  
BNP No results for input(s): BNPP in the last 72 hours. Liver Enzymes Recent Labs  
  04/28/19 
0347 TP 6.1* ALB 2.4* AP 75 SGOT 85* Thyroid Studies No results found for: T4, T3U, TSH, TSHEXT Procedures/imaging: see electronic medical records for all procedures/Xrays and details which were not copied into this note but were reviewed prior to creation of Plan Medications:  
Current Facility-Administered Medications Medication Dose Route Frequency  folic acid (FOLVITE) tablet 1 mg  1 mg Oral DAILY  thiamine HCL (B-1) tablet 100 mg  100 mg Oral DAILY  acetaminophen (TYLENOL) tablet 650 mg  650 mg Oral Q4H PRN  
 sodium chloride (NS) flush 5-40 mL  5-40 mL IntraVENous Q8H  
 sodium chloride (NS) flush 5-40 mL  5-40 mL IntraVENous PRN  
 LORazepam (ATIVAN) tablet 1 mg  1 mg Oral Q1H PRN  Or  
 LORazepam (ATIVAN) injection 1 mg  1 mg IntraVENous Q1H PRN  
 LORazepam (ATIVAN) tablet 2 mg  2 mg Oral Q1H PRN  
 Or  LORazepam (ATIVAN) injection 2 mg  2 mg IntraVENous Q1H PRN  therapeutic multivitamin (THERAGRAN) tablet 1 Tab  1 Tab Oral DAILY  dextrose 5% and 0.9% NaCl infusion  75 mL/hr IntraVENous CONTINUOUS  
 hydrALAZINE (APRESOLINE) 20 mg/mL injection 10 mg  10 mg IntraVENous Q6H PRN  
 nicotine (NICODERM CQ) 14 mg/24 hr patch 1 Patch  1 Patch TransDERmal DAILY  sodium chloride (NS) flush 5-40 mL  5-40 mL IntraVENous Q8H  
 sodium chloride (NS) flush 5-40 mL  5-40 mL IntraVENous PRN  
 heparin (porcine) injection 5,000 Units  5,000 Units SubCUTAneous Q8H Assessment/Plan Active Problems: 
  Alcohol withdrawal ([address] (3/26/2019) Alcohol abuse (4/25/2019) Hypokalemia (4/25/2019) Plan Seizure most like due to alcohol withdrawal  
Continue IV hydration Alcohol  CLAIRE withwal protocol with ativan Neurology consult 
  
  
Anxiety/ family history of schizophrenia and Bipolar Psychiatry consult done Recommended Doxepin 10 mg qhs  
  
  
Alcohol abuse and dependent Case manger consult  For further evaluation inpatient rehab Continue thiamin and folic acid 
 
  
Tobacco abuse and dependence Nicotine patch 
  
Elevated Liver enzymes/ thrombocytopenia/ H/o Pancreatitis Monitor PLT count Check ultrasound liver Hepatitis screen , ferritin level Check lipase level 
l Hypokalemia/ hypomagnesemia Potassium  40 misty x 1 then 20 misty daily 2 gm Mag IV Recheck lab in AM 
Cbc, cmp, mag 
  
 
 
 
Morteza Urbina MD 
4/28/2019 11:14 AM

## 2019-04-28 NOTE — ROUTINE PROCESS
Bedside and Verbal shift change report given to Harry Vazquez RN and Gisele Myers RN (oncoming nurse) by Susan Watson RN (offgoing nurse). Report included the following information SBAR, Kardex, Intake/Output, Recent Results and Cardiac Rhythm Sinus Rhythm.

## 2019-04-29 ENCOUNTER — APPOINTMENT (OUTPATIENT)
Dept: ULTRASOUND IMAGING | Age: 54
DRG: 897 | End: 2019-04-29
Attending: FAMILY MEDICINE
Payer: COMMERCIAL

## 2019-04-29 LAB
ALBUMIN SERPL-MCNC: 2.4 G/DL (ref 3.4–5)
ALBUMIN/GLOB SERPL: 0.7 {RATIO} (ref 0.8–1.7)
ALP SERPL-CCNC: 65 U/L (ref 45–117)
ALT SERPL-CCNC: 43 U/L (ref 16–61)
ANION GAP SERPL CALC-SCNC: 4 MMOL/L (ref 3–18)
AST SERPL-CCNC: 65 U/L (ref 15–37)
BASOPHILS # BLD: 0 K/UL (ref 0–0.1)
BASOPHILS NFR BLD: 1 % (ref 0–2)
BILIRUB SERPL-MCNC: 0.6 MG/DL (ref 0.2–1)
BUN SERPL-MCNC: 3 MG/DL (ref 7–18)
BUN/CREAT SERPL: 6 (ref 12–20)
CALCIUM SERPL-MCNC: 7.9 MG/DL (ref 8.5–10.1)
CHLORIDE SERPL-SCNC: 110 MMOL/L (ref 100–108)
CO2 SERPL-SCNC: 27 MMOL/L (ref 21–32)
CREAT SERPL-MCNC: 0.52 MG/DL (ref 0.6–1.3)
DIFFERENTIAL METHOD BLD: ABNORMAL
EOSINOPHIL # BLD: 0.1 K/UL (ref 0–0.4)
EOSINOPHIL NFR BLD: 2 % (ref 0–5)
ERYTHROCYTE [DISTWIDTH] IN BLOOD BY AUTOMATED COUNT: 16.3 % (ref 11.6–14.5)
GLOBULIN SER CALC-MCNC: 3.6 G/DL (ref 2–4)
GLUCOSE SERPL-MCNC: 101 MG/DL (ref 74–99)
HAV IGM SER QL: NEGATIVE
HBV CORE IGM SER QL: NEGATIVE
HBV SURFACE AG SER QL: <0.1 INDEX
HBV SURFACE AG SER QL: NEGATIVE
HCT VFR BLD AUTO: 33 % (ref 36–48)
HCV AB SER IA-ACNC: 0.02 INDEX
HCV AB SERPL QL IA: NEGATIVE
HCV COMMENT,HCGAC: NORMAL
HGB BLD-MCNC: 10.9 G/DL (ref 13–16)
LYMPHOCYTES # BLD: 2 K/UL (ref 0.9–3.6)
LYMPHOCYTES NFR BLD: 33 % (ref 21–52)
MAGNESIUM SERPL-MCNC: 1.9 MG/DL (ref 1.6–2.6)
MCH RBC QN AUTO: 31.2 PG (ref 24–34)
MCHC RBC AUTO-ENTMCNC: 33 G/DL (ref 31–37)
MCV RBC AUTO: 94.6 FL (ref 74–97)
MONOCYTES # BLD: 0.6 K/UL (ref 0.05–1.2)
MONOCYTES NFR BLD: 9 % (ref 3–10)
NEUTS SEG # BLD: 3.4 K/UL (ref 1.8–8)
NEUTS SEG NFR BLD: 55 % (ref 40–73)
PLATELET # BLD AUTO: 142 K/UL (ref 135–420)
PMV BLD AUTO: 11.1 FL (ref 9.2–11.8)
POTASSIUM SERPL-SCNC: 3.5 MMOL/L (ref 3.5–5.5)
PROT SERPL-MCNC: 6 G/DL (ref 6.4–8.2)
RBC # BLD AUTO: 3.49 M/UL (ref 4.7–5.5)
SODIUM SERPL-SCNC: 141 MMOL/L (ref 136–145)
SP1: NORMAL
SP2: NORMAL
SP3: NORMAL
WBC # BLD AUTO: 6.1 K/UL (ref 4.6–13.2)

## 2019-04-29 PROCEDURE — 74011250637 HC RX REV CODE- 250/637: Performed by: INTERNAL MEDICINE

## 2019-04-29 PROCEDURE — 74011250637 HC RX REV CODE- 250/637: Performed by: FAMILY MEDICINE

## 2019-04-29 PROCEDURE — 97161 PT EVAL LOW COMPLEX 20 MIN: CPT

## 2019-04-29 PROCEDURE — 83735 ASSAY OF MAGNESIUM: CPT

## 2019-04-29 PROCEDURE — 80053 COMPREHEN METABOLIC PANEL: CPT

## 2019-04-29 PROCEDURE — 85025 COMPLETE CBC W/AUTO DIFF WBC: CPT

## 2019-04-29 PROCEDURE — 76705 ECHO EXAM OF ABDOMEN: CPT

## 2019-04-29 PROCEDURE — 74011250637 HC RX REV CODE- 250/637: Performed by: HOSPITALIST

## 2019-04-29 PROCEDURE — 74011250636 HC RX REV CODE- 250/636: Performed by: INTERNAL MEDICINE

## 2019-04-29 PROCEDURE — 36415 COLL VENOUS BLD VENIPUNCTURE: CPT

## 2019-04-29 PROCEDURE — 65660000000 HC RM CCU STEPDOWN

## 2019-04-29 RX ADMIN — LORAZEPAM 1 MG: 1 TABLET ORAL at 10:54

## 2019-04-29 RX ADMIN — FOLIC ACID 1 MG: 1 TABLET ORAL at 10:28

## 2019-04-29 RX ADMIN — DOXEPIN HYDROCHLORIDE 10 MG: 10 CAPSULE ORAL at 22:38

## 2019-04-29 RX ADMIN — LORAZEPAM 1 MG: 1 TABLET ORAL at 20:12

## 2019-04-29 RX ADMIN — THERA TABS 1 TABLET: TAB at 10:27

## 2019-04-29 RX ADMIN — ACETAMINOPHEN 650 MG: 325 TABLET ORAL at 20:12

## 2019-04-29 RX ADMIN — Medication 10 ML: at 15:10

## 2019-04-29 RX ADMIN — Medication 100 MG: at 10:28

## 2019-04-29 RX ADMIN — HEPARIN SODIUM 5000 UNITS: 5000 INJECTION INTRAVENOUS; SUBCUTANEOUS at 15:10

## 2019-04-29 RX ADMIN — LORAZEPAM 1 MG: 1 TABLET ORAL at 22:38

## 2019-04-29 RX ADMIN — HEPARIN SODIUM 5000 UNITS: 5000 INJECTION INTRAVENOUS; SUBCUTANEOUS at 22:38

## 2019-04-29 RX ADMIN — Medication 10 ML: at 22:42

## 2019-04-29 RX ADMIN — POTASSIUM CHLORIDE 20 MEQ: 1.5 POWDER, FOR SOLUTION ORAL at 10:28

## 2019-04-29 NOTE — PROGRESS NOTES
PHYSICAL THERAPY EVALUATION AND DISCHARGE Patient: Ramona Bailey (54 y.o. male) Date: 4/29/2019 Primary Diagnosis: Alcohol withdrawal ([facility] [F10.239] Alcohol withdrawal ([facility] [F10.239] Precautions:   Fall PLOF: independent with mobility without an assistive device ASSESSMENT : 
Based on the objective data described below, the patient presents at independent level with ambulation and transfers without an assistive device. Patient does not require further skilled intervention at this level of care. PLAN : 
Recommendations and Planned Interventions:  
No formal PT needs identified at this time. Discharge Recommendations: None Further Equipment Recommendations for Discharge: N/A  
 
SUBJECTIVE:  
Patient stated ? I'm doing a lot better. ? OBJECTIVE DATA SUMMARY:  
 
Past Medical History:  
Diagnosis Date Alcohol abuse No past surgical history on file. Barriers to Learning/Limitations: None Compensate with: N/A Home Situation:  
Home Situation Home Environment: Private residence # Steps to Enter: 5 One/Two Story Residence: One story Living Alone: Yes Support Systems: Child(quinton) Patient Expects to be Discharged to[de-identified] Private residence Current DME Used/Available at Home: None Critical Behavior: 
 Calm and cooperative Strength:   
Strength: Within functional limits Tone & Sensation:  
Tone: Normal 
 Sensation: Intact Range Of Motion: 
AROM: Within functional limits Functional Mobility: 
Bed Mobility: 
Rolling: Independent Supine to Sit: Independent Sit to Supine: Independent Scooting: Independent Transfers: 
Sit to Stand: Independent Stand to Sit: Independent Balance:  
Sitting: Intact Standing: Intact; Without support Ambulation/Gait Training: 
Distance (ft): 200 Feet (ft) Assistive Device: (none) Ambulation - Level of Assistance: Independent Pain: 
Pain level pre-treatment: 0/10 Pain level post-treatment: 0/10 Pain Intervention(s): n/a Response to intervention: N/A Activity Tolerance:  
good Please refer to the flowsheet for vital signs taken during this treatment. After treatment:  
?         Patient left in no apparent distress sitting up in chair ? Patient left in no apparent distress in bed 
? Call bell left within reach ? Nursing notified ? Caregiver present ? Bed alarm activated ? SCDs applied COMMUNICATION/EDUCATION:  
?         Role of Physical Therapy in the acute care setting. ?         Fall prevention education was provided and the patient/caregiver indicated understanding. ? Patient/family have participated as able in goal setting and plan of care. ?         Patient/family agree to work toward stated goals and plan of care. ?         Patient understands intent and goals of therapy, but is neutral about his/her participation. ? Patient is unable to participate in goal setting/plan of care: ongoing with therapy staff. ?         Other: Thank you for this referral. 
Jazmin Good, PT Time Calculation: 15 mins Eval Complexity: History: MEDIUM  Complexity : 1-2 comorbidities / personal factors will impact the outcome/ POC Exam:LOW Complexity : 1-2 Standardized tests and measures addressing body structure, function, activity limitation and / or participation in recreation  Presentation: LOW Complexity : Stable, uncomplicated  Clinical Decision Making:Low Complexity 2  Overall Complexity:LOW

## 2019-04-29 NOTE — ROUTINE PROCESS
Bedside verbal  report received from [address] [place] [address], reviewed SBAR, [facility], VS, labs and summary of care. Call light in reach, patient denies pain. CIWA monitoring.

## 2019-04-29 NOTE — PROGRESS NOTES
Progress Note Patient: Randy Michel MRN: [id number]  CSN: [id number] YOB: 1965  Age: 49 y.o. Sex: male DOA: 4/25/2019 LOS:  LOS: 4 days Subjective:  
Pt is laying in bed, A&O x 3. He states that he has experienced significant improvement in tremor, but does experience occasional shaking. He denies any HA, sweating, or palpitations. He remains afebrile without tachycardia. He wants to stay in [facility] . Discussed with patient that in-patient rehab followed by outpatient rehab would be ideal. He agrees to this 
 
 discussed with case manger  on board, waiting to hear back from [facility] today which is partnered with [facility]. Pt would like to go home [facility] before going to rehab and his son  will drive him Potassium 3.5, Mag 1.9 AST 65, ALT 65 Ammonia 4/28: 44 Cont IV fluid and Ativan PRN Abdominal US performed this morning, pending results. Discontinue IVF and monitor need for Ativan Pt seen by neurology and psychiatry started on Doxepin 10 mg qhs per psychiatry recommendations Ammonia level slightly elevated no confusion has BM every day He took ativan 2 time last 24 h. But CIWA score is improving discussed with pt to avoid Ativan if he doesn't need it Chief Complaint:  
Chief Complaint Patient presents with  Alcohol Problem Review of systems General: No fevers or chills. Cardiovascular: No chest pain or pressure. No palpitations. Pulmonary: No shortness of breath, cough or wheeze. Gastrointestinal: Positive abdominal pain, No nausea, vomiting or diarrhea. Genitourinary: No urinary frequency, urgency or dysuria. Musculoskeletal: Very mild tremor in B/L UE on occasion. No joint or muscle pain, no back pain, no recent trauma. Neurologic: No headache. Question seizure on admission, No further seizure activity Objective:  
 
Physical Exam: 
Visit Vitals /89 (BP 1 Location: Right arm, BP Patient Position: At rest) Pulse 85 Temp 97.4 °F (36.3 °C) Resp 18 Ht 5' 8\" (1.727 m) Wt 88.6 kg (195 lb 6.4 oz) SpO2 99% BMI 29.71 kg/m² General:         Alert, cooperative, no acute distress   
HEENT: NC, Atraumatic. EOM intact, anicteric sclerae. Lungs: CTA Bilaterally. No Wheezing/Rhonchi/Rales. Heart:  Regular  rhythm,  No murmur, No Rubs, No Gallops Abdomen: Distended but soft, Non tender. Extremities:  no tremors no edema Psych:   Not anxious or agitated. Neurologic:  CN 2-12 grossly intact, Alert and oriented X 3. No acute neurological deficits Intake and Output: 
Current Shift:  04/29 0701 - 04/29 1900 In: 906.3 [I.V.:906.3] Out: - Last three shifts:  04/27 1901 - 04/29 0700 In: 0 [P.O.:1900; I.V.:900] Out: 1647 [BMOBE:3099] Labs: Results:  
   
Chemistry Recent Labs  
  04/29/19 
3005 04/28/19 
0347 04/27/19 
0450 * 110* 108*  141 137  
K 3.5 3.2* 3.9 * 107 109* CO2 27 26 21 BUN 3* 3* 3*  
CREA 0.52* 0.54* 0.55* CA 7.9* 7.8* 7.4* AGAP 4 8 7 BUCR 6* 6* 5* AP 65 75 77  
TP 6.0* 6.1* 6.2* ALB 2.4* 2.4* 2.0*  
GLOB 3.6 3.7 4.2* AGRAT 0.7* 0.6* 0.5* CBC w/Diff Recent Labs  
  04/29/19 
0338 04/28/19 
0347 04/27/19 
0450 WBC 6.1 6.2 5.4  
RBC 3.49* 3.65* 3.67* HGB 10.9* 11.7* 11.9*  
HCT 33.0* 34.5* 35.5*  138 125* GRANS 55 59 53 LYMPH 33 31 35 EOS 2 3 3 Cardiac Enzymes No results for input(s): CPK, CKND1, ASYA in the last 72 hours. No lab exists for component: [place] Coagulation No results for input(s): PTP, INR, APTT in the last 72 hours. No lab exists for component: INREXT, INREXT Lipid Panel No results found for: CHOL, CHOLPOCT, CHOLX, CHLST, CHOLV, 605365, HDL, LDL, LDLC, DLDLP, 761546, VLDLC, VLDL, TGLX, TRIGL, TRIGP, TGLPOCT, CHHD, CHHDX  
BNP No results for input(s): BNPP in the last 72 hours. Liver Enzymes Recent Labs  
  04/29/19 0338  
TP 6.0* ALB 2.4* AP 65 SGOT 65* Thyroid Studies No results found for: T4, T3U, TSH, TSHEXT, TSHEXT Procedures/imaging: see electronic medical records for all procedures/Xrays and details which were not copied into this note but were reviewed prior to creation of Plan Medications:  
Current Facility-Administered Medications Medication Dose Route Frequency  lactulose (CHRONULAC) 10 gram/15 mL solution 15 mL  10 g Oral DAILY PRN  
 doxepin (SINEquan) capsule 10 mg  10 mg Oral QPM  
 potassium chloride (KLOR-CON) packet for solution 20 mEq  20 mEq Oral DAILY  folic acid (FOLVITE) tablet 1 mg  1 mg Oral DAILY  thiamine HCL (B-1) tablet 100 mg  100 mg Oral DAILY  acetaminophen (TYLENOL) tablet 650 mg  650 mg Oral Q4H PRN  
 sodium chloride (NS) flush 5-40 mL  5-40 mL IntraVENous Q8H  
 sodium chloride (NS) flush 5-40 mL  5-40 mL IntraVENous PRN  
 LORazepam (ATIVAN) tablet 1 mg  1 mg Oral Q1H PRN Or  
 LORazepam (ATIVAN) injection 1 mg  1 mg IntraVENous Q1H PRN  
 LORazepam (ATIVAN) tablet 2 mg  2 mg Oral Q1H PRN Or  
 LORazepam (ATIVAN) injection 2 mg  2 mg IntraVENous Q1H PRN  therapeutic multivitamin (THERAGRAN) tablet 1 Tab  1 Tab Oral DAILY  hydrALAZINE (APRESOLINE) 20 mg/mL injection 10 mg  10 mg IntraVENous Q6H PRN  
 nicotine (NICODERM CQ) 14 mg/24 hr patch 1 Patch  1 Patch TransDERmal DAILY  sodium chloride (NS) flush 5-40 mL  5-40 mL IntraVENous Q8H  
 sodium chloride (NS) flush 5-40 mL  5-40 mL IntraVENous PRN  
 heparin (porcine) injection 5,000 Units  5,000 Units SubCUTAneous Q8H Assessment/Plan Active Problems: 
  Alcohol withdrawal ([address] (3/26/2019) Alcohol abuse (4/25/2019) Hypokalemia (4/25/2019) Plan Seizure- most like due to alcohol withdrawal   
- UDS on admission pos for benzodiazepines, negative for all others - Discontinue IV hydration - Alcohol CIWA withdrwal protocol with Ativan. - Discussed with neurology, pt does not need seizure medication as the seizure-like activity was likely due to alcohol withdrawal. 
  
Anxiety/ family history of schizophrenia and Bipolar - Per psychiatry, continue Doxepin 10 mg qhs  
 
Alcohol abuse and dependence - Case mangmena consulted for further evaluation for inpatient rehab. Currently working on placement for [facility]. - Continue thiamin and folic acid 
  
Tobacco abuse and dependence 
 - Cont Nicotine patch 
  
Elevated Liver enzymes/ thrombocytopenia/ H/o Pancreatitis - Liver US performed this morning- results pending - LFTs and Bilirubin down trending - Ammonia elevated but downtrending. Lactulose PRN for constipation - Platelets wnl, cont to monitor - Ferritin 805 - Lipase wnl 
- Hepatitis screen negative for Hep A, B and C 
- Liver function improving Hypokalemia/ hypomagnesemia 
- Cont Potassium 20 misty daily - Pt received 2 gm Mag IV on 4/28 
- Cont to monitor HTN 
- Cont. Hydralazine PRN  
 
Cbc, cmp, mag in am 
Pt and ot evaluation and treatment F/U liver US results Plan for discharge to inpatient rehab if remains stable Carson Warren MD 
4/29/2019 7:59 AM

## 2019-04-29 NOTE — PROGRESS NOTES
OT order received and chart reviewed. Pt was seen and evaluated/discharged from skilled OT services yesterday, 4/28/19. Spoke with patient in his room, with pt reporting no change in functional performance and that he continues to be independent in basic self-care/ADLs. Educated pt on the role of OT with pt demonstrating good understanding. Skilled OT is not indicated at this time. Will acknowledge and complete order.  
 
Thank you for this referral. 
Len Erickson MS, OTR/L

## 2019-04-29 NOTE — PROGRESS NOTES
Pt states insurance will not authorize inpatient rehab unless it takes place at a hospital. Patient has detoxed in the hospital and has been seen by psychiatry, no need for admission to psychiatric unit at [facility]. Pt is unwilling to go to approved treatment centers out of state. Referral made to [facility] treatment program, they have accepted, just need to get authorization from insurance which they are beginning now (2:30pm), will likely not hear from them until tomorrow. If pt receives authorization, can go tomorrow. Pt will likely have to go to outpatient substance abuse treatment if insurance does not authorize payment, as pt cannot pay out of pocket, pt understands this. Left message for intake at [facility], also called [facility] who stated pt can receive outpatient services through them by coming to walk in hours. MACKENZIE Moore Case Management [phone]

## 2019-04-29 NOTE — PROGRESS NOTES
Spoke with patient regarding alcohol treatment, patient is willing to go however is not willing to travel far/out of state for treatment, [facility] does not accept his insurance. Patient has been referred to [facility] and would be willing to go to intensive outpatient services, CM left a message with intake [facility] inpatient rehab in [place] also contacted, clinicals sent, discussed with Nadine Goodrich on phone, will review with clinical team and get back to CM. Per Dr. Rosenda Pagan, pt ready for d/c tomorrow. Reason for Admission:  Alcohol withdrawal ([facility] [F10.239] Alcohol withdrawal ([facility] [F10.239] RRAT Score:    9 Plan for utilizing home health:    Not at this time. Likelihood of Readmission:   LOW Transition of Care Plan:         
 
 
Initial assessment completed with patient. Cognitive status of patient: oriented to time, place, person and situation. Face sheet information confirmed:  yes. The patient designates son, Kaden Hannah, to participate in his discharge plan and to receive any needed information. This patient lives in a single family home with patient. Patient is able to navigate steps as needed. Prior to hospitalization, patient was considered to be independent with ADLs/IADLS : yes . Patient has a current ACP document on file: no The son will be available to transport patient home upon discharge. Patient is not currently active with home health. Patient has not stayed in a skilled nursing facility or rehab. This patient is on dialysis :no 
 
Currently, the discharge plan is Rehab (inpatient vs outpatient). The patient states that he can obtain his medications from the pharmacy, and take his medications as directed. Patient's current insurance is [employer] Care Management Interventions PCP Verified by CM: Yes(would like to switch to dr. Little Colon within same practice) Palliative Care Criteria Met (RRAT>21 & CHF Dx)?: No 
Mode of Transport at Discharge: Self Transition of Care Consult (CM Consult): Discharge Planning(inpatient drug rehab) Current Support Network: Own Home Confirm Follow Up Transport: Family Plan discussed with Pt/Family/Caregiver: Yes Discharge Location Discharge Placement: Inpatient susbstance abuse program 
 
 
 
MACKENZIE Prieto Case Management [phone]

## 2019-04-29 NOTE — PROGRESS NOTES
Progress Note Patient: Andriy Matamoros MRN: [id number]  CSN: [id number] YOB: 1965  Age: 49 y.o. Sex: male DOA: 4/25/2019 LOS:  LOS: 4 days Subjective:  
Pt is laying in bed, A&O x 3. He states that he has experienced significant improvement in tremor, but does experience occasional shaking. He denies any HA, sweating, or palpitations. He remains afebrile without tachycardia. Pt does wish to stop drinking and begin a rehab program. He is concerned about the distance to many of the programs and wants to stay in the local area for intensive outpatient treatment if possible. Discussed with patient that in-patient rehab followed by outpatient rehab would be ideal. He agrees to this but still wants to stay local.  on board, waiting to hear back from [facility] today which is partnered with [facility]. Pt does express concern for direct admission to rehab because he wants to go home first to make arrangements. Pt will contact his sons to see if they can help him with pets while he is at rehab. Plan for discharge tomorrow morning once rehab plans are arranged. Potassium 3.5, Mag 1.9 AST 65, ALT 65 Ammonia 4/28: 44 Cont IV fluid and Ativan PRN Abdominal US performed this morning, pending results. Discontinue IVF and monitor need for Ativan Chief Complaint:  
Chief Complaint Patient presents with  Alcohol Problem Review of systems General: No fevers or chills. Cardiovascular: No chest pain or pressure. No palpitations. Pulmonary: No shortness of breath, cough or wheeze. Gastrointestinal: Positive abdominal pain, No nausea, vomiting or diarrhea. Genitourinary: No urinary frequency, urgency or dysuria. Musculoskeletal: Very mild tremor in B/L UE on occasion. No joint or muscle pain, no back pain, no recent trauma. Neurologic: No headache. Question seizure on admission, No further seizure activity Objective:  
 
Physical Exam: 
Visit Vitals /88 Pulse 73 Temp 96.9 °F (36.1 °C) Resp 18 Ht 5' 8\" (1.727 m) Wt 88.6 kg (195 lb 6.4 oz) SpO2 94% BMI 29.71 kg/m² General:         Alert, cooperative, no acute distress   
HEENT: NC, Atraumatic. EOM intact, anicteric sclerae. Lungs: CTA Bilaterally. No Wheezing/Rhonchi/Rales. Heart:  Regular  rhythm,  No murmur, No Rubs, No Gallops Abdomen: Distended but soft, Non tender.  +Bowel sounds. Extremities: Slight tremor in B/L UE. No LE edema Psych:   Not anxious or agitated. Neurologic:  CN 2-12 grossly intact, Alert and oriented X 3. No acute neurological deficits Intake and Output: 
Current Shift:  No intake/output data recorded. Last three shifts:  04/27 1901 - 04/29 0700 In: 0 [P.O.:1900; I.V.:900] Out: 3308 [RVFAX:4533] Labs: Results:  
   
Chemistry Recent Labs  
  04/29/19 
5017 04/28/19 0347 04/27/19 
0450 * 110* 108*  141 137  
K 3.5 3.2* 3.9 * 107 109* CO2 27 26 21 BUN 3* 3* 3*  
CREA 0.52* 0.54* 0.55* CA 7.9* 7.8* 7.4* AGAP 4 8 7 BUCR 6* 6* 5* AP 65 75 77  
TP 6.0* 6.1* 6.2* ALB 2.4* 2.4* 2.0*  
GLOB 3.6 3.7 4.2* AGRAT 0.7* 0.6* 0.5* CBC w/Diff Recent Labs  
  04/29/19 
0338 04/28/19 
0347 04/27/19 
0450 WBC 6.1 6.2 5.4  
RBC 3.49* 3.65* 3.67* HGB 10.9* 11.7* 11.9*  
HCT 33.0* 34.5* 35.5*  138 125* GRANS 55 59 53 LYMPH 33 31 35 EOS 2 3 3 Cardiac Enzymes No results for input(s): CPK, CKND1, ASYA in the last 72 hours. No lab exists for component: [place] Coagulation No results for input(s): PTP, INR, APTT in the last 72 hours. No lab exists for component: INREXT Lipid Panel No results found for: CHOL, CHOLPOCT, CHOLX, CHLST, CHOLV, 612522, HDL, LDL, LDLC, DLDLP, 256614, VLDLC, VLDL, TGLX, TRIGL, TRIGP, TGLPOCT, CHHD, CHHDX  
BNP No results for input(s): BNPP in the last 72 hours. Liver Enzymes Recent Labs  
  04/29/19 
1422 TP 6.0*  
 ALB 2.4* AP 65 SGOT 65* Thyroid Studies No results found for: T4, T3U, TSH, TSHEXT Procedures/imaging: see electronic medical records for all procedures/Xrays and details which were not copied into this note but were reviewed prior to creation of Plan Medications:  
Current Facility-Administered Medications Medication Dose Route Frequency  doxepin (SINEquan) capsule 10 mg  10 mg Oral QPM  
 potassium chloride (KLOR-CON) packet for solution 20 mEq  20 mEq Oral DAILY  folic acid (FOLVITE) tablet 1 mg  1 mg Oral DAILY  thiamine HCL (B-1) tablet 100 mg  100 mg Oral DAILY  acetaminophen (TYLENOL) tablet 650 mg  650 mg Oral Q4H PRN  
 sodium chloride (NS) flush 5-40 mL  5-40 mL IntraVENous Q8H  
 sodium chloride (NS) flush 5-40 mL  5-40 mL IntraVENous PRN  
 LORazepam (ATIVAN) tablet 1 mg  1 mg Oral Q1H PRN Or  
 LORazepam (ATIVAN) injection 1 mg  1 mg IntraVENous Q1H PRN  
 LORazepam (ATIVAN) tablet 2 mg  2 mg Oral Q1H PRN Or  
 LORazepam (ATIVAN) injection 2 mg  2 mg IntraVENous Q1H PRN  therapeutic multivitamin (THERAGRAN) tablet 1 Tab  1 Tab Oral DAILY  dextrose 5% and 0.9% NaCl infusion  75 mL/hr IntraVENous CONTINUOUS  
 hydrALAZINE (APRESOLINE) 20 mg/mL injection 10 mg  10 mg IntraVENous Q6H PRN  
 nicotine (NICODERM CQ) 14 mg/24 hr patch 1 Patch  1 Patch TransDERmal DAILY  sodium chloride (NS) flush 5-40 mL  5-40 mL IntraVENous Q8H  
 sodium chloride (NS) flush 5-40 mL  5-40 mL IntraVENous PRN  
 heparin (porcine) injection 5,000 Units  5,000 Units SubCUTAneous Q8H Assessment/Plan Active Problems: 
  Alcohol withdrawal ([address] (3/26/2019) Alcohol abuse (4/25/2019) Hypokalemia (4/25/2019) Plan Seizure- most like due to alcohol withdrawal   
- UDS on admission pos for benzodiazepines, negative for all others - Discontinue IV hydration - Alcohol CIWA withdrwal protocol with Ativan.  Monitor use and discontinue prior to D/C 
 - Discussed with neurology, pt does not need seizure medication as the seizure-like activity was likely due to alcohol withdrawal. 
  
Anxiety/ family history of schizophrenia and Bipolar - Per psychiatry, continue Doxepin 10 mg qhs  
 
Alcohol abuse and dependence - Case mangmena consulted for further evaluation for inpatient rehab. Currently working on placement for [facility]. - Continue thiamin and folic acid 
  
Tobacco abuse and dependence 
 - Cont Nicotine patch 
  
Elevated Liver enzymes/ thrombocytopenia/ H/o Pancreatitis - Liver US performed this morning- results pending - LFTs and Bilirubin down trending - Ammonia elevated but downtrending. Lactulose PRN for constipation - Platelets wnl, cont to monitor - Ferritin 805 - Lipase wnl 
- Hepatitis screen negative for Hep A, B and C Hypokalemia/ hypomagnesemia 
- Cont Potassium 20 misty daily - Pt received 2 gm Mag IV on 4/28 
- Cont to monitor HTN 
- Cont. Hydralazine PRN  
 
Cbc, cmp, mag in am 
F/U liver US results Plan for discharge to inpatient rehab tomorrow Sandeep Lopez, [address] 4/29/2019 7:59 AM

## 2019-04-30 LAB
ANION GAP SERPL CALC-SCNC: 5 MMOL/L (ref 3–18)
BASOPHILS # BLD: 0 K/UL (ref 0–0.1)
BASOPHILS NFR BLD: 0 % (ref 0–2)
BUN SERPL-MCNC: 4 MG/DL (ref 7–18)
BUN/CREAT SERPL: 7 (ref 12–20)
CALCIUM SERPL-MCNC: 8.4 MG/DL (ref 8.5–10.1)
CHLORIDE SERPL-SCNC: 108 MMOL/L (ref 100–108)
CO2 SERPL-SCNC: 29 MMOL/L (ref 21–32)
CREAT SERPL-MCNC: 0.55 MG/DL (ref 0.6–1.3)
DIFFERENTIAL METHOD BLD: ABNORMAL
EOSINOPHIL # BLD: 0.2 K/UL (ref 0–0.4)
EOSINOPHIL NFR BLD: 3 % (ref 0–5)
ERYTHROCYTE [DISTWIDTH] IN BLOOD BY AUTOMATED COUNT: 16.3 % (ref 11.6–14.5)
GLUCOSE SERPL-MCNC: 104 MG/DL (ref 74–99)
HCT VFR BLD AUTO: 34.5 % (ref 36–48)
HGB BLD-MCNC: 11.4 G/DL (ref 13–16)
LYMPHOCYTES # BLD: 2 K/UL (ref 0.9–3.6)
LYMPHOCYTES NFR BLD: 31 % (ref 21–52)
MAGNESIUM SERPL-MCNC: 1.9 MG/DL (ref 1.6–2.6)
MCH RBC QN AUTO: 31.8 PG (ref 24–34)
MCHC RBC AUTO-ENTMCNC: 33 G/DL (ref 31–37)
MCV RBC AUTO: 96.1 FL (ref 74–97)
MONOCYTES # BLD: 0.6 K/UL (ref 0.05–1.2)
MONOCYTES NFR BLD: 9 % (ref 3–10)
NEUTS SEG # BLD: 3.7 K/UL (ref 1.8–8)
NEUTS SEG NFR BLD: 57 % (ref 40–73)
PLATELET # BLD AUTO: 150 K/UL (ref 135–420)
PMV BLD AUTO: 11.2 FL (ref 9.2–11.8)
POTASSIUM SERPL-SCNC: 3.8 MMOL/L (ref 3.5–5.5)
RBC # BLD AUTO: 3.59 M/UL (ref 4.7–5.5)
SODIUM SERPL-SCNC: 142 MMOL/L (ref 136–145)
WBC # BLD AUTO: 6.5 K/UL (ref 4.6–13.2)

## 2019-04-30 PROCEDURE — 65660000000 HC RM CCU STEPDOWN

## 2019-04-30 PROCEDURE — 36415 COLL VENOUS BLD VENIPUNCTURE: CPT

## 2019-04-30 PROCEDURE — 80048 BASIC METABOLIC PNL TOTAL CA: CPT

## 2019-04-30 PROCEDURE — 85025 COMPLETE CBC W/AUTO DIFF WBC: CPT

## 2019-04-30 PROCEDURE — 74011250637 HC RX REV CODE- 250/637: Performed by: FAMILY MEDICINE

## 2019-04-30 PROCEDURE — 74011250637 HC RX REV CODE- 250/637: Performed by: HOSPITALIST

## 2019-04-30 PROCEDURE — 74011250636 HC RX REV CODE- 250/636: Performed by: INTERNAL MEDICINE

## 2019-04-30 PROCEDURE — 83735 ASSAY OF MAGNESIUM: CPT

## 2019-04-30 PROCEDURE — 74011250637 HC RX REV CODE- 250/637: Performed by: INTERNAL MEDICINE

## 2019-04-30 RX ORDER — DOXEPIN HYDROCHLORIDE 10 MG/1
10 CAPSULE ORAL EVERY EVENING
Qty: 30 CAP | Refills: 0 | Status: SHIPPED | OUTPATIENT
Start: 2019-04-30 | End: 2019-06-17

## 2019-04-30 RX ORDER — VITAMIN E 268 MG
400 CAPSULE ORAL DAILY
COMMUNITY
End: 2019-06-17

## 2019-04-30 RX ORDER — THERA TABS 400 MCG
1 TAB ORAL DAILY
Qty: 30 TAB | Refills: 0 | Status: SHIPPED | OUTPATIENT
Start: 2019-04-30

## 2019-04-30 RX ORDER — METOPROLOL TARTRATE 25 MG/1
12.5 TABLET, FILM COATED ORAL EVERY 12 HOURS
Status: DISCONTINUED | OUTPATIENT
Start: 2019-04-30 | End: 2019-05-01 | Stop reason: HOSPADM

## 2019-04-30 RX ORDER — LANOLIN ALCOHOL/MO/W.PET/CERES
100 CREAM (GRAM) TOPICAL DAILY
Qty: 30 TAB | Refills: 0 | Status: SHIPPED | OUTPATIENT
Start: 2019-04-30 | End: 2019-06-17

## 2019-04-30 RX ORDER — IBUPROFEN 200 MG
1 TABLET ORAL DAILY
Qty: 30 PATCH | Refills: 0 | Status: SHIPPED | OUTPATIENT
Start: 2019-04-30 | End: 2019-05-30

## 2019-04-30 RX ORDER — CHLORDIAZEPOXIDE HYDROCHLORIDE 25 MG/1
25 CAPSULE, GELATIN COATED ORAL
COMMUNITY
End: 2019-05-01

## 2019-04-30 RX ORDER — METOPROLOL TARTRATE 25 MG/1
12.5 TABLET, FILM COATED ORAL 2 TIMES DAILY
Qty: 60 TAB | Refills: 0 | Status: SHIPPED | OUTPATIENT
Start: 2019-04-30

## 2019-04-30 RX ORDER — FOLIC ACID 1 MG/1
1 TABLET ORAL DAILY
Qty: 30 TAB | Refills: 0 | Status: SHIPPED | OUTPATIENT
Start: 2019-04-30 | End: 2019-06-17

## 2019-04-30 RX ORDER — ACETAMINOPHEN 325 MG/1
650 TABLET ORAL
Qty: 40 TAB | Refills: 0 | Status: SHIPPED | OUTPATIENT
Start: 2019-04-30 | End: 2019-06-17

## 2019-04-30 RX ORDER — IBUPROFEN 200 MG
1 TABLET ORAL EVERY 24 HOURS
COMMUNITY
End: 2019-05-01

## 2019-04-30 RX ADMIN — Medication 10 ML: at 06:00

## 2019-04-30 RX ADMIN — DOXEPIN HYDROCHLORIDE 10 MG: 10 CAPSULE ORAL at 21:57

## 2019-04-30 RX ADMIN — POTASSIUM CHLORIDE 20 MEQ: 1.5 POWDER, FOR SOLUTION ORAL at 09:24

## 2019-04-30 RX ADMIN — Medication 100 MG: at 09:23

## 2019-04-30 RX ADMIN — Medication 10 ML: at 17:17

## 2019-04-30 RX ADMIN — Medication 10 ML: at 22:00

## 2019-04-30 RX ADMIN — HEPARIN SODIUM 5000 UNITS: 5000 INJECTION INTRAVENOUS; SUBCUTANEOUS at 05:59

## 2019-04-30 RX ADMIN — LORAZEPAM 1 MG: 1 TABLET ORAL at 09:32

## 2019-04-30 RX ADMIN — LORAZEPAM 1 MG: 1 TABLET ORAL at 05:58

## 2019-04-30 RX ADMIN — HEPARIN SODIUM 5000 UNITS: 5000 INJECTION INTRAVENOUS; SUBCUTANEOUS at 17:16

## 2019-04-30 RX ADMIN — METOPROLOL TARTRATE 12.5 MG: 25 TABLET ORAL at 10:00

## 2019-04-30 RX ADMIN — THERA TABS 1 TABLET: TAB at 09:23

## 2019-04-30 RX ADMIN — FOLIC ACID 1 MG: 1 TABLET ORAL at 09:23

## 2019-04-30 RX ADMIN — METOPROLOL TARTRATE 12.5 MG: 25 TABLET ORAL at 21:57

## 2019-04-30 RX ADMIN — HEPARIN SODIUM 5000 UNITS: 5000 INJECTION INTRAVENOUS; SUBCUTANEOUS at 22:00

## 2019-04-30 NOTE — ROUTINE PROCESS
Patient report received from [place], [facility].

## 2019-04-30 NOTE — ROUTINE PROCESS
Bedside shift change report given to Vamsi Katz (oncoming nurse) by Candelaria Boyer (offgoing nurse). Report included the following information SBAR, Kardex and MAR.

## 2019-04-30 NOTE — DISCHARGE SUMMARY
Discharge Summary        Patient ID:        Jordy Mcqueen        [id number]        69 y.o.        1965        Admission Date: 4/25/2019      Discharge date and time: 4/30/2019        Inpatient care:   Problem that led to hospitalization: Active Problems:    Alcohol withdrawal ([facility] [address] (3/26/2019)      Alcohol abuse (4/25/2019)      Hypokalemia (4/25/2019)       Key findings and test results:   - Alcohol withdrawal with seizure upon presentation  - UDS 4/25: positive for benzos, negative for all others  - US liver 4/29: Hepatomegaly with increased hepatic echotexture suggests nonspecific hepatocellular pathology such as steatosis. Mildly prominent gallbladder wall without other findings to suggest cholecystitis, probably secondary to hepatocellular pathology  - Hepatitis screen- negative for hep A, B and C                Consults:Neurology and Psychiatry    Procedures: None             Final diagnoses (primary and secondary): <principal problem not specified>                                            Active Problems:    Alcohol withdrawal ([facility] [address] (3/26/2019)      Alcohol abuse (4/25/2019)      Hypokalemia (4/25/2019)         Brief hospital course  Mr. Mehreen Cleary is a 49 y.o. male with PMH of alcohol abuse who presented to [facility] ED on 4/25 for alcohol withdrawal with seizure-like activity. Pt admitted and monitored on CIWA protocol x6 days. Pt received Ativan, IV D5 NS, Thiamine and Folic acid. Pt with hypokalemia and hypomagnesemia which were corrected with supplements and remained stable. No recurrent seizure activity during hospitalization and withdrawal symptoms have subsided. Pt does have anxiety which has likely contributed to elevated CIWA scores. Has not required ativan x24 hrs. Psychiatry saw patient and started him on Doxepin 10mg qhs, to help with anxiety which pt feels he has self medicated for with alcohol.  During hospital course, pt counseled on on alcohol use, risk of continued use and cessation encouraged. Pt had previously seen by GI Dr. Willa Clement for elevated liver enzymes, and had a CT scan of abdomen and US liver. Ct abdomen and pelvis 3/26/19: Hepatomegaly and hepatic steatosis. Mild left hydronephrosis with abrupt narrowing at the ureteropelvic junction. No calculi seen. This may be related to recent stone passage, stricture or parapelvic cysts. This appears similar to prior exam.    Ct head and neck 3/26 /19:Asymmetric white matter hypoattenuation involving the right corona radiata and external capsule. This likely reflects chronic ischemic change. Follow-up with nonemergent contrast enhanced brain MRI is recommended to exclude demyelinating disease or neoplastic process. Liver US 4/29: Hepatomegaly and Hepatic steatosis  Hepatitis screen negative. Ferratin level elevated    Originally wanted inpatient treatment for patient but his insurance declined to cover nearby inpatient alcohol rehab facility, [facility]. There were out of state facilities willing to accept the patient, however he is not agreeable to leaving the area. Case management has arranged intensive outpatient rehab with the soonest start date of 5/13. Pt is agreeable and plans to follow through with rehab. He does express concern for ability to remain sober after returning home if he becomes anxious or jittery. Plan  Seizure- most like due to alcohol withdrawal    - UDS on admission pos for benzodiazepines, negative for all others  - Alcohol WA withdrwal protocol with Ativan PRN x 5 days. - Discussed with neurology, pt does not need seizure medication as the seizure-like activity was likely due to alcohol withdrawal.     Anxiety/ Family history of schizophrenia and Bipolar  - Continue Doxepin 10 mg qhs      Alcohol abuse and dependence  - Continue thiamin, folic acid and multivitamin   - Start naltrexone  - Encourage cessation.  Pt to begin Intensive outpatient program at [facility] 5/13 as arranged by . Tobacco abuse and dependence   - Cont Nicotine patch. Encourage cessation     Elevated Liver enzymes/ Hepatomegaly with hepatic steatosis/ thrombocytopenia/ H/o Pancreatitis  - Liver function improving   - Ammonia elevated but downtrending. Lactulose PRN for constipation  - Platelets wnl  - Ferritin 805, pt will need to f/u as outpatient after alcohol avoidance for repeat Ferritin. - Lipase wnl  - Hepatitis screen negative for Hep A, B and C    Hypokalemia/ hypomagnesemia  - Resolved     HTN  - Cont metoprolol 12.5mg BID                    Discharge Exam:   Visit Vitals  BP (!) 160/97 (BP 1 Location: Right arm, BP Patient Position: At rest)   Pulse 96   Temp 98.1 °F (36.7 °C)   Resp 16   Ht 5' 8\" (1.727 m)   Wt 89.1 kg (196 lb 8 oz)   SpO2 96%   BMI 29.88 kg/m²     General:  Alert, cooperative, NAD, appears stated age. Head:  Normocephalic, without obvious abnormality, atraumatic. Eyes:  Conjunctivae clear. EOMs intact. Throat: Lips, mucosa, and tongue normal.    Neck: Supple, symmetrical, trachea midline. no JVD. Back:   ROM normal.   Lungs:   B/L lower lobe expiratory wheezing. No rhonchi or rales   Heart:  Regular rate and rhythm, S1, S2 normal, no murmur, click, rub or gallop. Abdomen:   Endorses intermittent abdominal pan. Soft, non-tender. Bowel sounds normal. No masses. Positive fatty liver disease per Liver US. Extremities: Extremities normal, atraumatic, no cyanosis or edema. No tremor at this time   Pulses: 2+ and symmetric all extremities. Skin: Skin color, texture, turgor normal. No rashes or lesions   Neurologic: CNII-XII intact. Normal strength throughout. Question seizure on admission, No further seizure activity. H/O anxiety.               Postdischarge care/patient self-management          Medications at discharge  including reasons for change and indications for new ones:   Current Discharge Medication List      START taking these medications    Details   nicotine (NICODERM CQ) 14 mg/24 hr patch 1 Patch by TransDERmal route daily for 30 days. Qty: 30 Patch, Refills: 0      acetaminophen (TYLENOL) 325 mg tablet Take 2 Tabs by mouth every four (4) hours as needed for Pain. Qty: 40 Tab, Refills: 0      doxepin (SINEQUAN) 10 mg capsule Take 1 Cap by mouth every evening. Qty: 30 Cap, Refills: 0      lactulose (CHRONULAC) 10 gram/15 mL solution Take 15 mL by mouth daily as needed (constipation). Qty: 1 Bottle, Refills: 0      therapeutic multivitamin (THERAGRAN) tablet Take 1 Tab by mouth daily. Qty: 30 Tab, Refills: 0      metoprolol tartrate (LOPRESSOR) 25 mg tablet Take 0.5 Tabs by mouth two (2) times a day. Qty: 60 Tab, Refills: 0         CONTINUE these medications which have CHANGED    Details   folic acid (FOLVITE) 1 mg tablet Take 1 Tab by mouth daily. Qty: 30 Tab, Refills: 0      thiamine HCL (B-1) 100 mg tablet Take 1 Tab by mouth daily. Qty: 30 Tab, Refills: 0         CONTINUE these medications which have NOT CHANGED    Details   vitamin E (AQUA GEMS) 400 unit capsule Take 400 Units by mouth daily. magnesium oxide (MAG-OX) 400 mg tablet Take 1 Tab by mouth daily. Qty: 30 Tab, Refills: 0      naltrexone (DEPADE) 50 mg tablet Take 1 Tab by mouth daily.   Qty: 30 Tab, Refills: 3         STOP taking these medications       nicotine (NICODERM CQ) 21 mg/24 hr Comments:   Reason for Stopping:         chlordiazePOXIDE (LIBRIUM) 25 mg capsule Comments:   Reason for Stopping:         multivit-mineral-iron-lutein (WOMEN'S CENTRUM SILVER WITH LUTEIN) tab tablet Comments:   Reason for Stopping:                   Pending laboratory work and tests: None         Condition at discharge and functional status: improved        Diet at discharge Resume previous diet        Activities at discharge: As tolerated        Discharge destination: Home        Contact information/plan for follow up care      24-hour phone number for hospital physician record                 Follow-up appointments with specific reason for an appointment and proposed management plan:    Follow-up with Family Medicine, Dr. Leonides Otero or Dr. Sulaiman Hernandez, in 3-5 days or after completion of in-patient program.  Follow-up with [address] [place] [address] Psychiatry for intensive outpatient alcohol rehab on 5/13 (can call to see if something becomes available sooner)     Recommended follow up procedures and tests including imaging tests:   - CBC, CMP, Mag, Ferritin, MRI with contrast of brain as f/u for prior study, ammonia                Josey [place], [place]- Student  4/30/19 10:00AM

## 2019-04-30 NOTE — PROGRESS NOTES
[facility] is out of network and not covered under [employer]. Spoke with Deb who stated they have other facilities in [place] and [place] that are in network, will reach out to them and check on availability. Spoke with pt who still does not want to go out of state for treatment. Stated he would prefer to go outpatient treatment first and try that and then pt will determine if he needs inpatient treatment. Pt provided with information on accepting facilities out of state, clinicals sent and insurance approved, pt can call them if needed. Pt set up with [address] [place] [address] Psychiatry outpatient, appointment and contact information provided. Pt's son to d/c home. Informed MD of above information. Discharge paperwork faxed to [address] [place] [address], [facility] Case Management [phone]

## 2019-04-30 NOTE — PROGRESS NOTES
Pt stated he is angry because of him been rejection by certain facility to transfer to d/t his insurance. Pt stated he made a phone call to his insurance company and spoke to a nurse. 2352: Snacks given to pt per request, pt appears calm and relaxed, will continue to monitor. Bedside shift change report given to Sebastián Birmingham RN (oncoming nurse) by Mario Castellon RN (offgoing nurse). Report included the following information SBAR, Intake/Output, MAR and Recent Results.

## 2019-04-30 NOTE — PROGRESS NOTES
CIWA protocol cont'd. Pt appears calm at this moment an din no apparent discomfort. Call light within pt's reach

## 2019-04-30 NOTE — PROGRESS NOTES
Received report on pt.from off going RN. Resting quietly in bed on rounds. Denies c/o pain or SOB at this time. No acute distress noted. Will cont to monitor for any changes in status. 1800 pt has been ambulating in room and hallway. No distress noted. Minimal s/s of withdrawal, mostly tremors and occ restlessness. 1940 Bedside and Verbal shift change report given to Lidia Mackenzie (oncoming nurse) by Dereck Boeck, RN (offgoing nurse). Report given with Allie MIRANDA and MAR.

## 2019-05-01 VITALS
RESPIRATION RATE: 16 BRPM | DIASTOLIC BLOOD PRESSURE: 86 MMHG | HEIGHT: 68 IN | OXYGEN SATURATION: 97 % | HEART RATE: 73 BPM | SYSTOLIC BLOOD PRESSURE: 127 MMHG | BODY MASS INDEX: 30.07 KG/M2 | WEIGHT: 198.41 LBS | TEMPERATURE: 98.7 F

## 2019-05-01 PROCEDURE — 74011250637 HC RX REV CODE- 250/637: Performed by: FAMILY MEDICINE

## 2019-05-01 PROCEDURE — 74011250637 HC RX REV CODE- 250/637: Performed by: HOSPITALIST

## 2019-05-01 PROCEDURE — 74011250636 HC RX REV CODE- 250/636: Performed by: INTERNAL MEDICINE

## 2019-05-01 PROCEDURE — 74011250637 HC RX REV CODE- 250/637: Performed by: INTERNAL MEDICINE

## 2019-05-01 RX ADMIN — Medication 100 MG: at 08:47

## 2019-05-01 RX ADMIN — FOLIC ACID 1 MG: 1 TABLET ORAL at 08:47

## 2019-05-01 RX ADMIN — HEPARIN SODIUM 5000 UNITS: 5000 INJECTION INTRAVENOUS; SUBCUTANEOUS at 06:44

## 2019-05-01 RX ADMIN — METOPROLOL TARTRATE 12.5 MG: 25 TABLET ORAL at 08:48

## 2019-05-01 RX ADMIN — THERA TABS 1 TABLET: TAB at 08:47

## 2019-05-01 NOTE — ROUTINE PROCESS
Bedside shift change report given to Nikki Serna RN (oncoming nurse) by Niranjan Doan (offgoing nurse). Report given with SBAR, Kardex, Intake/Output, MAR and Recent Results.

## 2019-05-01 NOTE — DISCHARGE SUMMARY
Discharge Summary        Patient ID:        Solmon Dandy        [id number]        6 y.o.        1965        Admission Date: 4/25/2019      Discharge date and time: 5/1/2019        Inpatient care:   Problem that led to hospitalization: Principal Problem:    Alcohol withdrawal ([address] (3/26/2019)    Active Problems:    Alcohol abuse (4/25/2019)      Hypokalemia (4/25/2019)       Key findings and test results:   - Alcohol withdrawal with seizure upon presentation  - UDS 4/25: positive for benzos, negative for all others  - US liver 4/29: Hepatomegaly with increased hepatic echotexture suggests nonspecific hepatocellular pathology such as steatosis. Mildly prominent gallbladder wall without other findings to suggest cholecystitis, probably secondary to hepatocellular pathology  - Hepatitis screen- negative for hep A, B and C                Consults:Neurology and Psychiatry    Procedures: None             Final diagnoses (primary and secondary): Alcohol withdrawal ([address]                                            Principal Problem:    Alcohol withdrawal ([facility] [address] (3/26/2019)    Active Problems:    Alcohol abuse (4/25/2019)      Hypokalemia (4/25/2019)         Brief hospital course  Mr. Fantasma Fna is a 49 y.o. male with PMH of alcohol abuse who presented to [facility] ED on 4/25 for alcohol withdrawal with seizure-like activity. Pt admitted and monitored on CIWA protocol x6 days. Pt received Ativan, IV D5 NS, Thiamine and Folic acid. Pt with hypokalemia and hypomagnesemia which were corrected with supplements and remained stable. No recurrent seizure activity during hospitalization and withdrawal symptoms have subsided. Pt does have anxiety which has likely contributed to elevated CIWA scores. Has not required ativan x24 hrs. Psychiatry saw patient and started him on Doxepin 10mg qhs, to help with anxiety which pt feels he has self medicated for with alcohol. psychiatry recommended against benzo     During hospital course, pt counseled on on alcohol use, risk of continued use and cessation encouraged. Pt had previously seen by GI Dr. Antwan Guajardo for elevated liver enzymes, and had a CT scan of abdomen and US liver. Previous ct scan Ct abdomen and pelvis 3/26/19 at [facility] : Hepatomegaly and hepatic steatosis. Mild left hydronephrosis with abrupt narrowing at the ureteropelvic junction. No calculi seen. This may be related to recent stone passage, stricture or parapelvic cysts. This appears similar to prior exam.       pt also had Ct head and neck 3/26 /19 at [facility]  Showed :Asymmetric white matter hypoattenuation involving the right corona radiata and external capsule. This likely reflects chronic ischemic change. Follow-up with nonemergent contrast enhanced brain MRI is recommended to exclude demyelinating disease or neoplastic process. Pt was seen by neurology while he is in patient he doesn't need seizure med no further work up advised to continue Detox for at least 5 days . Pt finished 5 days and he has no sign of withdrawal or shaking         Liver US 4/29: Hepatomegaly and Hepatic steatosis  Hepatitis screen negative. Ferratin level elevated needs f/u as [facility]     Originally wanted inpatient treatment for patient but his insurance declined to cover nearby inpatient alcohol rehab facility, [facility]. There were out of state facilities willing to accept the patient, however he is not agreeable to leaving the area. Case management has arranged intensive outpatient rehab with the soonest start date of 5/13. Pt is agreeable and plans to follow through with rehab. He does express concern for ability to remain sober after returning home if he becomes anxious or jittery.  Discussed with pt to call every day to get earlier if they have earlier APPT    Plan  Seizure- most like due to alcohol withdrawal    - UDS on admission pos for benzodiazepines, negative for all others  - Alcohol AVISWA withdrwal protocol with Ativan PRN x 5 days.  - Discussed with neurology, pt does not need seizure medication as the seizure-like activity was likely due to alcohol withdrawal.     Anxiety/ Family history of schizophrenia and Bipolar  - Continue Doxepin 10 mg qhs      Alcohol abuse and dependence  - Continue thiamin, folic acid and multivitamin   - Start naltrexone  - Encourage cessation. Pt to begin Intensive outpatient program at [facility] 5/13 as arranged by . Tobacco abuse and dependence   - Cont Nicotine patch. Encourage cessation     Elevated Liver enzymes/ Hepatomegaly with hepatic steatosis/ thrombocytopenia/ H/o Pancreatitis  - Liver function improving   - Ammonia elevated but downtrending. Lactulose PRN for constipation  - Platelets wnl  - Ferritin 805, pt will need to f/u as outpatient after alcohol avoidance for repeat Ferritin. - Lipase wnl  - Hepatitis screen negative for Hep A, B and C    Hypokalemia/ hypomagnesemia  - Resolved     HTN  - Cont metoprolol 12.5mg BID                    Discharge Exam:   Visit Vitals  /80 (BP 1 Location: Right arm, BP Patient Position: At rest)   Pulse 73   Temp 97.5 °F (36.4 °C)   Resp 16   Ht 5' 8\" (1.727 m)   Wt 90 kg (198 lb 6.6 oz)   SpO2 96%   BMI 30.17 kg/m²     General:  Alert, cooperative, NAD, appears stated age. Head:  Normocephalic, without obvious abnormality, atraumatic. Eyes:  Conjunctivae clear. EOMs intact. Throat: Lips, mucosa, and tongue normal.    Neck: Supple, symmetrical, trachea midline. no JVD. Back:   ROM normal.   Lungs:   B/L lower lobe expiratory wheezing. No rhonchi or rales   Heart:  Regular rate and rhythm, S1, S2 normal, no murmur, click, rub or gallop. Abdomen:   Endorses intermittent abdominal pan. Soft, non-tender. Bowel sounds normal. No masses. Positive fatty liver disease per Liver US. Extremities: Extremities normal, atraumatic, no cyanosis or edema. No tremor at this time   Pulses: 2+ and symmetric all extremities. Skin: Skin color, texture, turgor normal. No rashes or lesions   Neurologic: CNII-XII intact. Normal strength throughout. Question seizure on admission, No further seizure activity. H/O anxiety. Postdischarge care/patient self-management          Medications at discharge  including reasons for change and indications for new ones:   Current Discharge Medication List      START taking these medications    Details   nicotine (NICODERM CQ) 14 mg/24 hr patch 1 Patch by TransDERmal route daily for 30 days. Qty: 30 Patch, Refills: 0      acetaminophen (TYLENOL) 325 mg tablet Take 2 Tabs by mouth every four (4) hours as needed for Pain. Qty: 40 Tab, Refills: 0      doxepin (SINEQUAN) 10 mg capsule Take 1 Cap by mouth every evening. Qty: 30 Cap, Refills: 0      lactulose (CHRONULAC) 10 gram/15 mL solution Take 15 mL by mouth daily as needed (constipation). Qty: 1 Bottle, Refills: 0      therapeutic multivitamin (THERAGRAN) tablet Take 1 Tab by mouth daily. Qty: 30 Tab, Refills: 0      metoprolol tartrate (LOPRESSOR) 25 mg tablet Take 0.5 Tabs by mouth two (2) times a day. Qty: 60 Tab, Refills: 0         CONTINUE these medications which have CHANGED    Details   folic acid (FOLVITE) 1 mg tablet Take 1 Tab by mouth daily. Qty: 30 Tab, Refills: 0      thiamine HCL (B-1) 100 mg tablet Take 1 Tab by mouth daily. Qty: 30 Tab, Refills: 0         CONTINUE these medications which have NOT CHANGED    Details   vitamin E (AQUA GEMS) 400 unit capsule Take 400 Units by mouth daily. magnesium oxide (MAG-OX) 400 mg tablet Take 1 Tab by mouth daily. Qty: 30 Tab, Refills: 0      naltrexone (DEPADE) 50 mg tablet Take 1 Tab by mouth daily.   Qty: 30 Tab, Refills: 3         STOP taking these medications       nicotine (NICODERM CQ) 21 mg/24 hr Comments:   Reason for Stopping:         chlordiazePOXIDE (LIBRIUM) 25 mg capsule Comments:   Reason for Stopping:         multivit-mineral-iron-lutein (WOMEN'S CENTRUM SILVER WITH LUTEIN) tab tablet Comments:   Reason for Stopping:                   Pending laboratory work and tests: None         Condition at discharge and functional status: improved        Diet at discharge Resume previous diet        Activities at discharge: As tolerated        Discharge destination: Home        Contact information/plan for follow up care      24-hour phone number for hospital physician record                 Follow-up appointments with specific reason for an appointment and proposed management plan:    Follow-up with Family Medicine, Dr. Phoenix Veloz or Dr. Gabi Rico, in 3-5 days or after completion of in-patient program.  Follow-up with [address] [place] [address] for intensive outpatient alcohol rehab on 5/13 (can call to see if something becomes available sooner)     Recommended follow up procedures and tests including imaging tests:   - CBC, CMP, Mag, Ferritin, MRI with contrast of brain as f/u for prior study, ammonia  - time spent for discharge more than 30 minutes included review record discuss with case manger and patient education and enforcement to remains sober and f/u his Avinash Gutierrez MD  5/1/19 12:122

## 2019-05-01 NOTE — PROGRESS NOTES
Progress Note Patient: Saadia Galvan MRN: [id number]  CSN: [id number] YOB: 1965  Age: 49 y.o. Sex: male DOA: 4/25/2019 LOS:  LOS: 5 days Subjective:  
Patient still scoring CIWA 8 today requiring ativan dosing, likely due to anxiety, BP have been elevated will add b-blocker to help anxiety, tremor, bp. Insurance declined request to cover nearby inpatient alcohol rehab. Per discussion with case iron there is out of state facility willing to accept patient that is covered by his insurance however patient declines to leave the area. He would rather stay local and attend intensive out patient rehab. Case t has arranged for outpatient rehab, soonest intake day available 5/13, pt has number to call to see if can get in sooner agrees to call and check in daily. Abd Us with fatty liver, discusssed with patient importance of maintaining sobriety Pt seen by neurology and psychiatry started on Doxepin 10 mg qhs per psychiatry recommendations Chief Complaint:  
Chief Complaint Patient presents with  Alcohol Problem Review of systems General: No fevers or chills. Cardiovascular: No chest pain or pressure. No palpitations. Pulmonary: No shortness of breath, cough or wheeze. Gastrointestinal: Positive abdominal pain, No nausea, vomiting or diarrhea. Genitourinary: No urinary frequency, urgency or dysuria. Musculoskeletal: Very mild tremor in B/L UE on occasion. No joint or muscle pain, no back pain, no recent trauma. Neurologic: No headache. Question seizure on admission, No further seizure activity Objective:  
 
Physical Exam: 
Visit Vitals BP (!) 156/92 (BP 1 Location: Left arm, BP Patient Position: At rest) Pulse 77 Temp 98.7 °F (37.1 °C) Resp 20 Ht 5' 8\" (1.727 m) Wt 89.1 kg (196 lb 8 oz) SpO2 96% BMI 29.88 kg/m² General:         Alert, cooperative, no acute distress   
 HEENT: NC, Atraumatic. EOM intact, anicteric sclerae. Lungs: CTA Bilaterally. No Wheezing/Rhonchi/Rales. Heart:  Regular  rhythm,  No murmur, No Rubs, No Gallops Abdomen: Distended but soft, Non tender. Extremities:  no tremors no edema Psych:   Not anxious or agitated. Neurologic:  CN 2-12 grossly intact, Alert and oriented X 3. No acute neurological deficits Intake and Output: 
Current Shift:  No intake/output data recorded. Last three shifts:  04/29 0701 - 04/30 1900 In: 906.3 [I.V.:906.3] Out: -  
 
Labs: Results:  
   
Chemistry Recent Labs 04/30/19 
6664 04/29/19 
2500 04/28/19 
8404 * 101* 110*  141 141  
K 3.8 3.5 3.2*  
 110* 107 CO2 29 27 26 BUN 4* 3* 3*  
CREA 0.55* 0.52* 0.54* CA 8.4* 7.9* 7.8* AGAP 5 4 8 BUCR 7* 6* 6* AP  --  65 75 TP  --  6.0* 6.1* ALB  --  2.4* 2.4*  
GLOB  --  3.6 3.7 AGRAT  --  0.7* 0.6* CBC w/Diff Recent Labs 04/30/19 
3516 04/29/19 
8088 04/28/19 
8406 WBC 6.5 6.1 6.2 RBC 3.59* 3.49* 3.65* HGB 11.4* 10.9* 11.7* HCT 34.5* 33.0* 34.5*  
 142 138 GRANS 57 55 59 LYMPH 31 33 31 EOS 3 2 3 Cardiac Enzymes No results for input(s): CPK, CKND1, ASYA in the last 72 hours. No lab exists for component: Bulah Ratel Coagulation No results for input(s): PTP, INR, APTT in the last 72 hours. No lab exists for component: INREXT, INREXT Lipid Panel No results found for: CHOL, CHOLPOCT, CHOLX, CHLST, CHOLV, 302331, HDL, LDL, LDLC, DLDLP, 951189, VLDLC, VLDL, TGLX, TRIGL, TRIGP, TGLPOCT, CHHD, CHHDX  
BNP No results for input(s): BNPP in the last 72 hours. Liver Enzymes Recent Labs  
  04/29/19 
5781 TP 6.0* ALB 2.4* AP 65 SGOT 65* Thyroid Studies No results found for: T4, T3U, TSH, TSHEXT, TSHEXT Procedures/imaging: see electronic medical records for all procedures/Xrays and details which were not copied into this note but were reviewed prior to creation of Plan Medications:  
Current Facility-Administered Medications Medication Dose Route Frequency  metoprolol tartrate (LOPRESSOR) tablet 12.5 mg  12.5 mg Oral Q12H  
 lactulose (CHRONULAC) 10 gram/15 mL solution 15 mL  10 g Oral DAILY PRN  
 doxepin (SINEquan) capsule 10 mg  10 mg Oral QPM  
 potassium chloride (KLOR-CON) packet for solution 20 mEq  20 mEq Oral DAILY  folic acid (FOLVITE) tablet 1 mg  1 mg Oral DAILY  thiamine HCL (B-1) tablet 100 mg  100 mg Oral DAILY  acetaminophen (TYLENOL) tablet 650 mg  650 mg Oral Q4H PRN  
 sodium chloride (NS) flush 5-40 mL  5-40 mL IntraVENous Q8H  
 sodium chloride (NS) flush 5-40 mL  5-40 mL IntraVENous PRN  
 LORazepam (ATIVAN) tablet 1 mg  1 mg Oral Q1H PRN Or  
 LORazepam (ATIVAN) injection 1 mg  1 mg IntraVENous Q1H PRN  
 LORazepam (ATIVAN) tablet 2 mg  2 mg Oral Q1H PRN Or  
 LORazepam (ATIVAN) injection 2 mg  2 mg IntraVENous Q1H PRN  therapeutic multivitamin (THERAGRAN) tablet 1 Tab  1 Tab Oral DAILY  hydrALAZINE (APRESOLINE) 20 mg/mL injection 10 mg  10 mg IntraVENous Q6H PRN  
 nicotine (NICODERM CQ) 14 mg/24 hr patch 1 Patch  1 Patch TransDERmal DAILY  heparin (porcine) injection 5,000 Units  5,000 Units SubCUTAneous Q8H Assessment/Plan Principal Problem: 
  Alcohol withdrawal ([facility] [address] (3/26/2019) Active Problems: 
  Alcohol abuse (4/25/2019) Hypokalemia (4/25/2019) Plan Seizure- most like due to alcohol withdrawal   
- UDS on admission pos for benzodiazepines, negative for all others - Alcohol [facility] withdrwal protocol with Ativan still requiring several doses, will monitor one more day, add beta blocker\ - Discussed with neurology, pt does not need seizure medication as the seizure-like activity was likely due to alcohol withdrawal. 
  
Anxiety/ family history of schizophrenia and Bipolar - Per psychiatry, continue Doxepin 10 mg qhs  
 
Alcohol abuse and dependence; Elevated Liver enzymes/ thrombocytopenia/ H/o Pancreatitis due to alcohol abuse 
- OUtpatient intensive therapy with justino martinez, pt counseled on need to remain quit and future risk of progression of his liver disease, cirrhosis, liver failure. - Continue thiamin and folic acid 
  
Tobacco abuse and dependence 
 - Cont Nicotine patch 
  
 
Hypokalemia/ hypomagnesemia 
-resolved, monitor HTN 
- Cont. Hydralazine PRN, add lopressor 12.5 bid Anthony Hammonds MD 
4/30/2019 2023

## 2019-05-01 NOTE — PROGRESS NOTES
Home health order noted, patient is not home bound, patient drives daily and will take himself to all outpatient appointments. Attending MD notified.

## 2019-06-13 ENCOUNTER — HOSPITAL ENCOUNTER (INPATIENT)
Age: 54
LOS: 3 days | Discharge: NURSING, REHAB OR LONG-TERM CARE | DRG: 896 | End: 2019-06-17
Attending: EMERGENCY MEDICINE | Admitting: PSYCHIATRY & NEUROLOGY
Payer: COMMERCIAL

## 2019-06-13 DIAGNOSIS — E87.6 HYPOKALEMIA: ICD-10-CM

## 2019-06-13 DIAGNOSIS — F10.10 ALCOHOL ABUSE: Primary | ICD-10-CM

## 2019-06-13 DIAGNOSIS — F10.939 ALCOHOL WITHDRAWAL SYNDROME WITH COMPLICATION (HCC): ICD-10-CM

## 2019-06-13 LAB
ALBUMIN SERPL-MCNC: 2.9 G/DL (ref 3.4–5)
ALBUMIN/GLOB SERPL: 0.7 {RATIO} (ref 0.8–1.7)
ALP SERPL-CCNC: 93 U/L (ref 45–117)
ALT SERPL-CCNC: 43 U/L (ref 16–61)
AMPHET UR QL SCN: NEGATIVE
ANION GAP SERPL CALC-SCNC: 12 MMOL/L (ref 3–18)
APPEARANCE UR: CLEAR
AST SERPL-CCNC: 40 U/L (ref 15–37)
BARBITURATES UR QL SCN: POSITIVE
BASOPHILS # BLD: 0 K/UL (ref 0–0.1)
BASOPHILS NFR BLD: 0 % (ref 0–2)
BENZODIAZ UR QL: NEGATIVE
BILIRUB SERPL-MCNC: 0.3 MG/DL (ref 0.2–1)
BILIRUB UR QL: NEGATIVE
BUN SERPL-MCNC: 3 MG/DL (ref 7–18)
BUN/CREAT SERPL: 3 (ref 12–20)
CALCIUM SERPL-MCNC: 7.7 MG/DL (ref 8.5–10.1)
CANNABINOIDS UR QL SCN: NEGATIVE
CHLORIDE SERPL-SCNC: 86 MMOL/L (ref 100–108)
CO2 SERPL-SCNC: 29 MMOL/L (ref 21–32)
COCAINE UR QL SCN: NEGATIVE
COLOR UR: YELLOW
CREAT SERPL-MCNC: 0.92 MG/DL (ref 0.6–1.3)
DIFFERENTIAL METHOD BLD: ABNORMAL
EOSINOPHIL # BLD: 0.2 K/UL (ref 0–0.4)
EOSINOPHIL NFR BLD: 2 % (ref 0–5)
ERYTHROCYTE [DISTWIDTH] IN BLOOD BY AUTOMATED COUNT: 13.5 % (ref 11.6–14.5)
ETHANOL SERPL-MCNC: 158 MG/DL (ref 0–3)
GLOBULIN SER CALC-MCNC: 4 G/DL (ref 2–4)
GLUCOSE SERPL-MCNC: 109 MG/DL (ref 74–99)
GLUCOSE UR STRIP.AUTO-MCNC: NEGATIVE MG/DL
HCT VFR BLD AUTO: 38.8 % (ref 36–48)
HDSCOM,HDSCOM: ABNORMAL
HGB BLD-MCNC: 13.7 G/DL (ref 13–16)
HGB UR QL STRIP: NEGATIVE
KETONES UR QL STRIP.AUTO: NEGATIVE MG/DL
LEUKOCYTE ESTERASE UR QL STRIP.AUTO: NEGATIVE
LYMPHOCYTES # BLD: 2.4 K/UL (ref 0.9–3.6)
LYMPHOCYTES NFR BLD: 24 % (ref 21–52)
MAGNESIUM SERPL-MCNC: 1.1 MG/DL (ref 1.6–2.6)
MCH RBC QN AUTO: 31.8 PG (ref 24–34)
MCHC RBC AUTO-ENTMCNC: 35.3 G/DL (ref 31–37)
MCV RBC AUTO: 90 FL (ref 74–97)
METHADONE UR QL: NEGATIVE
MONOCYTES # BLD: 0.9 K/UL (ref 0.05–1.2)
MONOCYTES NFR BLD: 9 % (ref 3–10)
NEUTS SEG # BLD: 6.3 K/UL (ref 1.8–8)
NEUTS SEG NFR BLD: 65 % (ref 40–73)
NITRITE UR QL STRIP.AUTO: NEGATIVE
OPIATES UR QL: NEGATIVE
PCP UR QL: NEGATIVE
PH UR STRIP: 5.5 [PH] (ref 5–8)
PLATELET # BLD AUTO: 204 K/UL (ref 135–420)
PMV BLD AUTO: 9.8 FL (ref 9.2–11.8)
POTASSIUM SERPL-SCNC: 2.7 MMOL/L (ref 3.5–5.5)
PROT SERPL-MCNC: 6.9 G/DL (ref 6.4–8.2)
PROT UR STRIP-MCNC: NEGATIVE MG/DL
RBC # BLD AUTO: 4.31 M/UL (ref 4.7–5.5)
SODIUM SERPL-SCNC: 127 MMOL/L (ref 136–145)
SP GR UR REFRACTOMETRY: 1.01 (ref 1–1.03)
UROBILINOGEN UR QL STRIP.AUTO: 0.2 EU/DL (ref 0.2–1)
WBC # BLD AUTO: 9.8 K/UL (ref 4.6–13.2)

## 2019-06-13 PROCEDURE — 96365 THER/PROPH/DIAG IV INF INIT: CPT

## 2019-06-13 PROCEDURE — 81003 URINALYSIS AUTO W/O SCOPE: CPT

## 2019-06-13 PROCEDURE — 80307 DRUG TEST PRSMV CHEM ANLYZR: CPT

## 2019-06-13 PROCEDURE — 80053 COMPREHEN METABOLIC PANEL: CPT

## 2019-06-13 PROCEDURE — 83735 ASSAY OF MAGNESIUM: CPT

## 2019-06-13 PROCEDURE — 96366 THER/PROPH/DIAG IV INF ADDON: CPT

## 2019-06-13 PROCEDURE — HZ2ZZZZ DETOXIFICATION SERVICES FOR SUBSTANCE ABUSE TREATMENT: ICD-10-PCS | Performed by: PSYCHIATRY & NEUROLOGY

## 2019-06-13 PROCEDURE — 85025 COMPLETE CBC W/AUTO DIFF WBC: CPT

## 2019-06-13 PROCEDURE — 99285 EMERGENCY DEPT VISIT HI MDM: CPT

## 2019-06-13 PROCEDURE — 74011250637 HC RX REV CODE- 250/637: Performed by: NURSE PRACTITIONER

## 2019-06-13 PROCEDURE — 74011250636 HC RX REV CODE- 250/636: Performed by: NURSE PRACTITIONER

## 2019-06-13 RX ORDER — LORAZEPAM 1 MG/1
2 TABLET ORAL
Status: DISCONTINUED | OUTPATIENT
Start: 2019-06-13 | End: 2019-06-14

## 2019-06-13 RX ORDER — SODIUM CHLORIDE 0.9 % (FLUSH) 0.9 %
5-40 SYRINGE (ML) INJECTION AS NEEDED
Status: DISCONTINUED | OUTPATIENT
Start: 2019-06-13 | End: 2019-06-14

## 2019-06-13 RX ORDER — LORAZEPAM 2 MG/ML
1 INJECTION INTRAMUSCULAR
Status: DISCONTINUED | OUTPATIENT
Start: 2019-06-13 | End: 2019-06-14

## 2019-06-13 RX ORDER — LORAZEPAM 2 MG/ML
3 INJECTION INTRAMUSCULAR
Status: DISCONTINUED | OUTPATIENT
Start: 2019-06-13 | End: 2019-06-14

## 2019-06-13 RX ORDER — POTASSIUM CHLORIDE 7.45 MG/ML
10 INJECTION INTRAVENOUS
Status: COMPLETED | OUTPATIENT
Start: 2019-06-13 | End: 2019-06-14

## 2019-06-13 RX ORDER — LORAZEPAM 1 MG/1
1 TABLET ORAL
Status: DISCONTINUED | OUTPATIENT
Start: 2019-06-13 | End: 2019-06-14

## 2019-06-13 RX ORDER — POTASSIUM CHLORIDE 750 MG/1
10 TABLET, EXTENDED RELEASE ORAL
Status: COMPLETED | OUTPATIENT
Start: 2019-06-13 | End: 2019-06-13

## 2019-06-13 RX ORDER — SODIUM CHLORIDE 0.9 % (FLUSH) 0.9 %
5-40 SYRINGE (ML) INJECTION EVERY 8 HOURS
Status: DISCONTINUED | OUTPATIENT
Start: 2019-06-13 | End: 2019-06-14

## 2019-06-13 RX ORDER — LORAZEPAM 2 MG/ML
2 INJECTION INTRAMUSCULAR
Status: DISCONTINUED | OUTPATIENT
Start: 2019-06-13 | End: 2019-06-14

## 2019-06-13 RX ADMIN — POTASSIUM CHLORIDE 10 MEQ: 10 TABLET, EXTENDED RELEASE ORAL at 21:33

## 2019-06-13 RX ADMIN — POTASSIUM CHLORIDE 10 MEQ: 10 INJECTION, SOLUTION INTRAVENOUS at 21:33

## 2019-06-13 RX ADMIN — Medication 10 ML: at 22:00

## 2019-06-14 PROBLEM — K70.9 ALCOHOLIC LIVER DISEASE (HCC): Chronic | Status: ACTIVE | Noted: 2019-06-14

## 2019-06-14 LAB
ANION GAP SERPL CALC-SCNC: 9 MMOL/L (ref 3–18)
BUN SERPL-MCNC: 4 MG/DL (ref 7–18)
BUN/CREAT SERPL: 4 (ref 12–20)
CALCIUM SERPL-MCNC: 8.2 MG/DL (ref 8.5–10.1)
CHLORIDE SERPL-SCNC: 88 MMOL/L (ref 100–108)
CO2 SERPL-SCNC: 32 MMOL/L (ref 21–32)
CREAT SERPL-MCNC: 1.06 MG/DL (ref 0.6–1.3)
ETHANOL SERPL-MCNC: <3 MG/DL (ref 0–3)
GLUCOSE SERPL-MCNC: 101 MG/DL (ref 74–99)
POTASSIUM SERPL-SCNC: 3.5 MMOL/L (ref 3.5–5.5)
SODIUM SERPL-SCNC: 129 MMOL/L (ref 136–145)

## 2019-06-14 PROCEDURE — 96365 THER/PROPH/DIAG IV INF INIT: CPT

## 2019-06-14 PROCEDURE — 80307 DRUG TEST PRSMV CHEM ANLYZR: CPT

## 2019-06-14 PROCEDURE — 80048 BASIC METABOLIC PNL TOTAL CA: CPT

## 2019-06-14 PROCEDURE — 74011250637 HC RX REV CODE- 250/637

## 2019-06-14 PROCEDURE — 74011250637 HC RX REV CODE- 250/637: Performed by: NURSE PRACTITIONER

## 2019-06-14 PROCEDURE — 65220000003 HC RM SEMIPRIVATE PSYCH

## 2019-06-14 PROCEDURE — 96367 TX/PROPH/DG ADDL SEQ IV INF: CPT

## 2019-06-14 PROCEDURE — 74011250637 HC RX REV CODE- 250/637: Performed by: PSYCHIATRY & NEUROLOGY

## 2019-06-14 PROCEDURE — 96361 HYDRATE IV INFUSION ADD-ON: CPT

## 2019-06-14 PROCEDURE — 74011250636 HC RX REV CODE- 250/636: Performed by: NURSE PRACTITIONER

## 2019-06-14 RX ORDER — LORAZEPAM 1 MG/1
2 TABLET ORAL
Status: DISCONTINUED | OUTPATIENT
Start: 2019-06-14 | End: 2019-06-17

## 2019-06-14 RX ORDER — LANOLIN ALCOHOL/MO/W.PET/CERES
100 CREAM (GRAM) TOPICAL DAILY
Status: DISCONTINUED | OUTPATIENT
Start: 2019-06-14 | End: 2019-06-17 | Stop reason: HOSPADM

## 2019-06-14 RX ORDER — IBUPROFEN 200 MG
TABLET ORAL
Status: COMPLETED
Start: 2019-06-14 | End: 2019-06-15

## 2019-06-14 RX ORDER — BENZTROPINE MESYLATE 1 MG/1
1 TABLET ORAL
Status: DISCONTINUED | OUTPATIENT
Start: 2019-06-14 | End: 2019-06-17 | Stop reason: HOSPADM

## 2019-06-14 RX ORDER — LORAZEPAM 1 MG/1
1 TABLET ORAL
Status: DISCONTINUED | OUTPATIENT
Start: 2019-06-14 | End: 2019-06-17

## 2019-06-14 RX ORDER — BENZTROPINE MESYLATE 1 MG/ML
1 INJECTION INTRAMUSCULAR; INTRAVENOUS
Status: DISCONTINUED | OUTPATIENT
Start: 2019-06-14 | End: 2019-06-17 | Stop reason: HOSPADM

## 2019-06-14 RX ORDER — HALOPERIDOL 5 MG/1
5 TABLET ORAL
Status: DISCONTINUED | OUTPATIENT
Start: 2019-06-14 | End: 2019-06-16

## 2019-06-14 RX ORDER — FOLIC ACID 1 MG/1
1 TABLET ORAL DAILY
Status: DISCONTINUED | OUTPATIENT
Start: 2019-06-14 | End: 2019-06-17 | Stop reason: HOSPADM

## 2019-06-14 RX ORDER — TRAZODONE HYDROCHLORIDE 50 MG/1
50 TABLET ORAL
Status: DISCONTINUED | OUTPATIENT
Start: 2019-06-14 | End: 2019-06-17 | Stop reason: HOSPADM

## 2019-06-14 RX ORDER — HYDROXYZINE PAMOATE 50 MG/1
50 CAPSULE ORAL
Status: DISCONTINUED | OUTPATIENT
Start: 2019-06-14 | End: 2019-06-17 | Stop reason: RX

## 2019-06-14 RX ORDER — HALOPERIDOL 5 MG/ML
5 INJECTION INTRAMUSCULAR
Status: DISCONTINUED | OUTPATIENT
Start: 2019-06-14 | End: 2019-06-16

## 2019-06-14 RX ORDER — THERA TABS 400 MCG
1 TAB ORAL DAILY
Status: DISCONTINUED | OUTPATIENT
Start: 2019-06-14 | End: 2019-06-17 | Stop reason: HOSPADM

## 2019-06-14 RX ORDER — IBUPROFEN 200 MG
1 TABLET ORAL
Status: COMPLETED | OUTPATIENT
Start: 2019-06-14 | End: 2019-06-15

## 2019-06-14 RX ORDER — MAGNESIUM SULFATE HEPTAHYDRATE 40 MG/ML
2 INJECTION, SOLUTION INTRAVENOUS ONCE
Status: COMPLETED | OUTPATIENT
Start: 2019-06-14 | End: 2019-06-14

## 2019-06-14 RX ADMIN — LORAZEPAM 2 MG: 1 TABLET ORAL at 10:45

## 2019-06-14 RX ADMIN — THERA TABS 1 TABLET: TAB at 09:39

## 2019-06-14 RX ADMIN — HYDROXYZINE PAMOATE 50 MG: 50 CAPSULE ORAL at 06:10

## 2019-06-14 RX ADMIN — LORAZEPAM 1 MG: 1 TABLET ORAL at 00:30

## 2019-06-14 RX ADMIN — MAGNESIUM SULFATE HEPTAHYDRATE 2 G: 40 INJECTION, SOLUTION INTRAVENOUS at 03:36

## 2019-06-14 RX ADMIN — FOLIC ACID 1 MG: 1 TABLET ORAL at 09:39

## 2019-06-14 RX ADMIN — Medication 100 MG: at 09:39

## 2019-06-14 RX ADMIN — LORAZEPAM 1 MG: 1 TABLET ORAL at 06:10

## 2019-06-14 RX ADMIN — SODIUM CHLORIDE 1000 ML: 900 INJECTION, SOLUTION INTRAVENOUS at 02:18

## 2019-06-14 RX ADMIN — LORAZEPAM 2 MG: 1 TABLET ORAL at 15:09

## 2019-06-14 NOTE — BH NOTES
Upon 1:1 with this nurse, patient voices desire to be sober \"because at this point, it's a matter of my health. \"  Patient voiced his son recently graduated from [address] [place] [address] with a Civil Engineering degree. Patient states \"I asked him what he wanted for graduation and he said \"you dad. I want to have my dad around. \" that kind of surprised me and I had to turn away and get myself together. \"  Patient was reminded that his son \"doesn't just want you AROUND, but I'm sure it's more of wanting to spend time with you and enjoy spending time with you, not visiting you in the hospital or being your caretaker. \"  Patient reassured that the above statement was not meant in malice but \"because you're a fun person to have around, especially when you're sober. \" patient voiced understanding that statement was meant sincerely. Patient also voices understanding the drinking was only \"a band-aid for the pain. It was numbing it but when I would wake up, the pain would be HORRIBLE. Each time I relapse, my health gets worse and worse. \"  Patient is pleasant and appears appreciative of support.

## 2019-06-14 NOTE — BH NOTES
Patient admitted to [address] [place] [address], report given to [address] from [address] [place] [address]. Patient states he wants to get treatment to stop drinking alcohol, but not here. States he does not belong here on this type of unit. It was explained to patient to talk with his doctor about being transferred to adult unit and at present no bed was available on adult unit but later on later a bed may be available. Patient was anxious and nervous about being on [facility]. Patient was given reassurance about his safety. Was given vistaril 50 mg po for anxiety and ativan 1 mg po for alcohol withdraw symptoms. Patient reports feeling depressed but denies suicidal ideations  . noted to be resting quietly at 0630

## 2019-06-14 NOTE — ED NOTES
Assumed care of patient in bed, alert and awake, CIWA score now ten, ativan 1 mg po given. Patient requesting nicotine patch. Patient ambulated to bathroom with unsteady gate. Moved bowels. Returned to bed and rep[alaced on telemetry monitor. Call bell within reach.

## 2019-06-14 NOTE — BH NOTES
Patient accepted from [address] [place] [address] and oriented to unit. Patient escorted to his room by this nurse and went right into the bathroom. Vital signs to be taken once he is finished in the bathroom. Patient is known to this nurse from previous admissions. Patient's face and abdominal appear overly swollen. Patient has shuffling gait, and since last admission with this nurse, appears to have decrease in strength and mobility. Patient is very tremulous and was given PRN Ativan 2mg tab PO for CIWA of 13. Will continue to monitor and provide assistance as needed.

## 2019-06-14 NOTE — H&P
[address]  HISTORY AND PHYSICAL    Name:  Ralene Sicard  MR#:   [id number]  :  1965  ACCOUNT #:  [de-identified]  ADMIT DATE:  2019    IDENTIFYING DATA:  The patient is a 50-year-old  white male, resident of \[facility]". He is a retired auto worker. He is covered by [employer]. BASIS FOR ADMISSION:  The patient is admitted after self-presentation to the emergency room requesting alcohol detoxification again. He has a 30-year history of alcoholism and had a detoxification about three weeks ago down in [place], [place], and then again relapsed soon thereafter. He had been in contact with a facility called [facility] but they do not do detoxifications but were willing to put him into the rehabilitation program, but he first needed to be detoxified. He has been drinking a half a gallon of rum a day. He drinks all day long and when he begins shaking, sweating and gets nauseous, he will take a drink to stop the shaking. He says he has had seizures in the past.  He has a history of alcohol liver disease as well as a history of alcoholic pancreatitis. He has been detoxified at this facility on many occasions and I had the opportunity to detoxify him 21 years ago here. He had been seen as an outpatient by Jan Nickerson, [de-identified] assistant and was evaluated for anxiety and depression. He had tried several antidepressants and did not get any better, so he did not want any. His anxiety appears to be related to his withdrawal symptoms. His more recent detoxifications have been in [facility] including nine months ago, five months ago, three months ago and two months ago. He has been seen by Dr. Pedro Burris. He had seen a gastroenterologist as an outpatient. He does say that he has not been on Campral or naltrexone that he knows, but has been on Antabuse and would drink even on Antabuse.   He had two DUIs and two drunk in public charges in the past, though he says these were in the distant past.  He does not drive when he is drinking now. He has had blackouts. He says he does feel depressed due to his drinking and gets anxiety symptoms. MEDICAL HISTORY:  He does have the above-noted alcohol liver disease and pancreatitis. He says he gets midepigastric pain that is generally located in the front. He did have leg edema last month. At one time, he was told that he had hypertension, but it may have been related to his withdrawal.    ALLERGIES:  HE ENDORSED ALLERGY TO AMOXICILLIN. REVIEW OF SYSTEMS:  The review of systems done in the emergency room was negative for everything. PHYSICAL EXAMINATION:  Vital signs had shown blood pressure 135/73, pulse 98, respirations 16, temperature 98.6 degrees, pO2 of 93%. Physical examination at that time was totally normal.  A review of his current condition shows him to have a distended lower abdomen and that he was quite shaky as he would walk. He had hand tremors. LABORATORY TESTING:  Included a CBC which was normal.  He did have mild anemia with a CBC last month. Urinalysis showed dilute specimen. The comprehensive metabolic panel showed decreased potassium 2.7 mmol/L, decreased sodium 127 mmol/L, decreased chloride 86 mmol/L, decreased calcium 7.7 mg/dL, decreased magnesium 1.1 mg/dL, decreased albumin 2.9 gm/dL, increased AST 40 U/L. Urine drug screen was positive for barbiturates. Alcohol level showed intoxicated with an alcohol level of 158 mg/dL. A recent abdominal ultrasound on 04/29/2019 had shown him to have hepatomegaly with increased hepatic echotexture suggesting nonspecific hepatocellular pathology and a mildly prominent gallbladder wall, probably secondary to hepatocellular pathology. The hepatitis panel had been negative. SUBSTANCE ABUSE HISTORY:  Alcohol history is as above. He denied drug abuse.   He smokes two packs of cigarettes a day and wanted a nicotine patch. SOCIAL AND FAMILY HISTORY:  Family history of psychiatric illness was denied. He  10 years ago. He has a girlfriend who had just moved to [place]. He has two sons in the area. MENTAL STATUS EXAMINATION:  Revealed him to be an alert, white male, who was fluent. He was somewhat shaky both in walking and with his hands. Eye contact was fair. Speech was fluent. Mood was anxious to unhappy with a congruent affect. Thought processing was logical and goal directed. He denied hallucinations or delusions. He did not appear to be responding to internal stimuli. Thought processing was logical and goal directed. He denied homicidal or suicidal ideas. IQ was estimated in the normal range. He was not evidencing significant neurocognitive symptoms. Insight and judgment were influenced by his alcoholism. ASSESSMENT:  AXIS I:  Alcohol use disorder, severe. Alcohol intoxication, resolved. Alcohol withdrawal syndrome. Nicotine use disorder, severe. AXIS II:  None. AXIS III:  Alcohol liver disease. Alcoholic pancreatitis. Hypomagnesemia. Hypocalcemia. Hyponatremia. Hypochloremia. TREATMENT PLAN:  This patient is admitted for detoxification. He initially was admitted to the special treatment unit, but he would do better on the adult unit and needs to be transferred over. Should he have significant physical health symptoms, we may need to have a consultation with the hospitalists. We will detoxify utilizing lorazepam.  We will continue with his thiamine, folic acid, multiple vitamins. We will continue with individual, group and milieu therapies, art and recreation therapy, case management services, social work services. ESTIMATED LENGTH OF STAY:  Five to seven days. ANTICIPATED DISPOSITION:  The patient wishes to be referred from here over to the [facility] program for alcohol rehabilitation.   He has been in contact with them and already accepted if he is detoxified. PROGNOSIS:  Fair.       Eriberto Ventura MD      [id number]  D:  06/14/2019 11:24  T:  06/14/2019 15:13  JOB #:  [id number]

## 2019-06-14 NOTE — BSMART NOTE
Comprehensive Assessment Form Part 1 Disposition Dicussed with Isabella Yu NP, the plan is admit. The on-call Psychiatrist consulted was Dr. Fouzia Murillo. The admitting Psychiatrist will be Dr. Fouzia Murillo. The admitting Diagnosis is Alcohol Abuse. Admitted to room 106-4 Unit ST Integrated Summary Patient is a 49year old male who presented to the emergency room with c/o wanting \"alcohol detox. \" Patient stated that he plans  to be admitted at [facility] for 2 weeks after detoxification at [facility] is done. Patient said that he has been drinking since 14years of age and drinks [facility] all day and through the night. Patient added that he buys \"2 (1/2) gallons of rum at the same time. [employer]t [employer]illings [employer]illings I drink one and save the other 1/2 gallon in case something happens. \" 
 
Mental Status Exam 
 
The patient's appearance shows no evidence of impairment. The patient's behavior shows no evidence of impairment. The patient is oriented to time, place, person and situation. The patient's speech shows no evidence of impairment. The patient's mood is euthymic. The range of affect shows no evidence of impairment. The patient's thought content demonstrates no evidence of impairment. The thought process shows no evidence of impairment. The patient's perception shows no evidence of impairment. The patient's memory shows no evidence of impairment. The patient's appetite \" snack most of the time. The patient's sleep \"get about 1.5 hour of sleep. ..watching late night television and I take a drink. \" Patient is pleasant, calm, cooperative, fair eye contact, normal tone of voice, fair hygiene. Inpatient: :[facility], April, 2019, \"relapsed on alcohol one week after being discharged from [facility]. \" Outpatient: \"None\" Medications: \"Folic acid, Vitamin T-4\" Legal Issues: Denied Contact/Support Person: Crissy Garcia, son, [phone]\" CHAVEZ Lewis, RN

## 2019-06-14 NOTE — BH NOTES
Emily Paula is not participating in Recreational Therapy. Group time: 5500    Personal goal for participation: fresh air break    Goal orientation: social    Group therapy participation: refuse    Therapeutic interventions reviewed and discussed: Staff encouraged Pt.  To participate in group    Impression of participation: Pt refuse, chose to rest in bed despite staff encouragement

## 2019-06-14 NOTE — ED NOTES
Bedside report given to CONNER Ma. Pt is on monitor, in gown, on stretcher. Pt has IV access. Pt is AOX4. CIWA continued.

## 2019-06-14 NOTE — PROGRESS NOTES
Patient has been resting in bed most of the day. He did eat breakfast and Lunch , tolerated both meals. Returned to his room after his meals. . He has been monitored  per CIWA protocol.  Ativan 2 mg given for CIWA of 10 at 10:45am.

## 2019-06-14 NOTE — BH NOTES
GROUP THERAPY PROGRESS NOTE    Gina Wells was encouraged by staff but refused to participate in  Community.

## 2019-06-14 NOTE — ED NOTES
Pt states he drinks 1/2 gallon of malibu rum daily. Pt states he has had a seizure in the past from detox. Pt denies SI & HI.  Pt states he went to his PCP today; PCP told patient he needed help with detox. Pt has 1 bag of belongings. CLAIRE started.

## 2019-06-14 NOTE — ED PROVIDER NOTES
Patient with a history of chronic alcoholism presents emergency department for detox from alcohol. Patient states he drinks a half a gallon of [place] rum daily states he has had a seizure in the past from detox patient denies suicidal or homicidal thoughts. The history is provided by the patient. No  was used. Alcohol Problem   Primary symptoms include: intoxication. There areno confusion, no somnolence, no loss of consciousness, no seizures, no weakness, no agitation, no delusions, no hallucinations, no self-injury and no violence present at this time. This is a chronic problem. The problem has been gradually worsening. Suspected agents include alcohol. Pertinent negatives include no fever, no injury and no vomiting. Associated medical issues include addiction treatment. Associated medical issues do not include suicidal ideas. Past Medical History:   Diagnosis Date    Alcohol abuse        History reviewed. No pertinent surgical history. History reviewed. No pertinent family history. Social History     Socioeconomic History    Marital status:      Spouse name: Not on file    Number of children: Not on file    Years of education: Not on file    Highest education level: Not on file   Occupational History    Not on file   Social Needs    Financial resource strain: Not on file    Food insecurity:     Worry: Not on file     Inability: Not on file    Transportation needs:     Medical: Not on file     Non-medical: Not on file   Tobacco Use    Smoking status: Current Every Day Smoker     Packs/day: 2.00    Smokeless tobacco: Never Used   Substance and Sexual Activity    Alcohol use:  Yes     Alcohol/week: 2.4 oz     Types: 4 Cans of beer per week     Comment: 12 beers a day-fifth bourbon    Drug use: No    Sexual activity: Not on file   Lifestyle    Physical activity:     Days per week: Not on file     Minutes per session: Not on file    Stress: Not on file Relationships    Social connections:     Talks on phone: Not on file     Gets together: Not on file     Attends Zoroastrian service: Not on file     Active member of club or organization: Not on file     Attends meetings of clubs or organizations: Not on file     Relationship status: Not on file    Intimate partner violence:     Fear of current or ex partner: Not on file     Emotionally abused: Not on file     Physically abused: Not on file     Forced sexual activity: Not on file   Other Topics Concern    Not on file   Social History Narrative    Not on file         ALLERGIES: Amoxicillin    Review of Systems   Constitutional: Negative for fever. Gastrointestinal: Negative for vomiting. Skin: Negative for color change. Neurological: Negative for seizures, loss of consciousness and weakness. Psychiatric/Behavioral: Negative for agitation, confusion, hallucinations, self-injury and suicidal ideas. All other systems reviewed and are negative. Vitals:    06/14/19 0000 06/14/19 0017 06/14/19 0100 06/14/19 0200   BP: 135/73 144/74 122/69 120/71   Pulse:  98 88 90   Resp:  16 21 23   Temp:  98.4 °F (36.9 °C)  98.3 °F (36.8 °C)   SpO2:   93% 93%   Weight:       Height:                Physical Exam   Constitutional: He is oriented to person, place, and time. He appears well-developed and well-nourished. HENT:   Head: Normocephalic and atraumatic. Eyes: Pupils are equal, round, and reactive to light. Conjunctivae and EOM are normal.   Neck: Normal range of motion. Neck supple. Cardiovascular: Normal rate and regular rhythm. Pulmonary/Chest: Effort normal and breath sounds normal.   Abdominal: Soft. Bowel sounds are normal. There is no tenderness. Musculoskeletal: Normal range of motion. He exhibits no tenderness. Neurological: He is alert and oriented to person, place, and time. He has normal reflexes. Skin: Skin is warm and dry. Psychiatric: He has a normal mood and affect.  His behavior is normal. Judgment and thought content normal.   Nursing note and vitals reviewed. MDM  Number of Diagnoses or Management Options  Alcohol abuse:   Alcohol withdrawal syndrome with complication [facility]): Hypokalemia:   Diagnosis management comments: Potassium was 2.7. Gal of potassium ordered and p.o. potassium elevated alcohol level will reevaluate in a few hours after potassium is done and repeat the basic metabolic panel. After reevaluation will contact crisis for clearance. Sodium and chloride also decreased. Patient will get a bolus of normal saline solution. Magnesium 1.12 g of magnesium ordered also to replace the magnesium. [place] with crisis informed of medical clearance alcohol has decreased and potassium has normalized at 3.5.   According to the CIWA protocol patient has received 1 mg of Ativan p.o. for a increased score    0400  Per Merline Ellis with mental health patient to be admitted to inpatient mental health unit         Amount and/or Complexity of Data Reviewed  Clinical lab tests: ordered and reviewed  Review and summarize past medical records: yes  Independent visualization of images, tracings, or specimens: yes    Risk of Complications, Morbidity, and/or Mortality  Presenting problems: moderate  Diagnostic procedures: moderate  Management options: moderate    Patient Progress  Patient progress: stable         Procedures            Vitals:  Patient Vitals for the past 12 hrs:   Temp Pulse Resp BP SpO2   06/14/19 0200 98.3 °F (36.8 °C) 90 23 120/71 93 %   06/14/19 0100  88 21 122/69 93 %   06/14/19 0017 98.4 °F (36.9 °C) 98 16 144/74    06/14/19 0000    135/73    06/13/19 2345  91 15  96 %   06/13/19 2300  (!) 113 29 136/79 92 %   06/13/19 2242  86 25  93 %   06/13/19 2240  90 21 131/69 94 %   06/13/19 2234  91 24  94 %   06/13/19 2233  97 23  94 %   06/13/19 2232  97 20  96 %   06/13/19 2231  94 19  95 %   06/13/19 2230  92 18  96 %   06/13/19 2229  93 21  96 %   06/13/19 2228  93 18  95 %   06/13/19 2227  99 21  95 %   06/13/19 2226  98 18  96 %   06/13/19 2225  95 21  97 %   06/13/19 2215  97 21  96 %   06/13/19 2157  89 21  95 %   06/13/19 2115  92 17  97 %   06/13/19 2100     98 %   06/13/19 2045     97 %   06/13/19 2030     95 %   06/13/19 2015     93 %   06/13/19 2000    (!) 118/102 94 %   06/13/19 1952     97 %   06/13/19 1951     92 %   06/13/19 1950     95 %   06/13/19 1949  95   94 %   06/13/19 1947    130/77    06/13/19 1827 98.4 °F (36.9 °C) (!) 105 16 (!) 149/96 97 %       Medications ordered:   Medications   sodium chloride (NS) flush 5-40 mL (10 mL IntraVENous Given 6/13/19 2200)   sodium chloride (NS) flush 5-40 mL (has no administration in time range)   LORazepam (ATIVAN) tablet 1 mg (1 mg Oral Given 6/14/19 0030)     Or   LORazepam (ATIVAN) injection 1 mg ( IntraVENous See Alternative 6/14/19 0030)   LORazepam (ATIVAN) tablet 2 mg (has no administration in time range)     Or   LORazepam (ATIVAN) injection 2 mg (has no administration in time range)   LORazepam (ATIVAN) injection 3 mg (has no administration in time range)   nicotine (NICODERM CQ) 21 mg/24 hr patch 1 Patch (1 Patch TransDERmal Apply Patch 6/14/19 0218)   magnesium sulfate 2 g/50 ml IVPB (premix or compounded) (2 g IntraVENous New Bag 6/14/19 0336)   potassium chloride 10 mEq in 100 ml IVPB (0 mEq IntraVENous IV Completed 6/14/19 0015)   potassium chloride (KLOR-CON) tablet 10 mEq (10 mEq Oral Given 6/13/19 2133)   sodium chloride 0.9 % bolus infusion 1,000 mL (0 mL IntraVENous IV Completed 6/14/19 0336)         Lab findings:  Recent Results (from the past 12 hour(s))   CBC WITH AUTOMATED DIFF    Collection Time: 06/13/19  7:24 PM   Result Value Ref Range    WBC 9.8 4.6 - 13.2 K/uL    RBC 4.31 (L) 4.70 - 5.50 M/uL    HGB 13.7 13.0 - 16.0 g/dL    HCT 38.8 36.0 - 48.0 %    MCV 90.0 74.0 - 97.0 FL    MCH 31.8 24.0 - 34.0 PG    MCHC 35.3 31.0 - 37.0 g/dL    RDW 13.5 11.6 - 14.5 %    PLATELET 202 488 - 418 K/uL    MPV 9.8 9.2 - 11.8 FL    NEUTROPHILS 65 40 - 73 %    LYMPHOCYTES 24 21 - 52 %    MONOCYTES 9 3 - 10 %    EOSINOPHILS 2 0 - 5 %    BASOPHILS 0 0 - 2 %    ABS. NEUTROPHILS 6.3 1.8 - 8.0 K/UL    ABS. LYMPHOCYTES 2.4 0.9 - 3.6 K/UL    ABS. MONOCYTES 0.9 0.05 - 1.2 K/UL    ABS. EOSINOPHILS 0.2 0.0 - 0.4 K/UL    ABS. BASOPHILS 0.0 0.0 - 0.1 K/UL    DF AUTOMATED     METABOLIC PANEL, COMPREHENSIVE    Collection Time: 06/13/19  7:24 PM   Result Value Ref Range    Sodium 127 (L) 136 - 145 mmol/L    Potassium 2.7 (LL) 3.5 - 5.5 mmol/L    Chloride 86 (L) 100 - 108 mmol/L    CO2 29 21 - 32 mmol/L    Anion gap 12 3.0 - 18 mmol/L    Glucose 109 (H) 74 - 99 mg/dL    BUN 3 (L) 7.0 - 18 MG/DL    Creatinine 0.92 0.6 - 1.3 MG/DL    BUN/Creatinine ratio 3 (L) 12 - 20      GFR est AA >60 >60 ml/min/1.73m2    GFR est non-AA >60 >60 ml/min/1.73m2    Calcium 7.7 (L) 8.5 - 10.1 MG/DL    Bilirubin, total 0.3 0.2 - 1.0 MG/DL    ALT (SGPT) 43 16 - 61 U/L    AST (SGOT) 40 (H) 15 - 37 U/L    Alk.  phosphatase 93 45 - 117 U/L    Protein, total 6.9 6.4 - 8.2 g/dL    Albumin 2.9 (L) 3.4 - 5.0 g/dL    Globulin 4.0 2.0 - 4.0 g/dL    A-G Ratio 0.7 (L) 0.8 - 1.7     ETHYL ALCOHOL    Collection Time: 06/13/19  7:24 PM   Result Value Ref Range    ALCOHOL(ETHYL),SERUM 158 (H) 0 - 3 MG/DL   MAGNESIUM    Collection Time: 06/13/19  7:24 PM   Result Value Ref Range    Magnesium 1.1 (L) 1.6 - 2.6 mg/dL   URINALYSIS W/ RFLX MICROSCOPIC    Collection Time: 06/13/19  7:40 PM   Result Value Ref Range    Color YELLOW      Appearance CLEAR      Specific gravity 1.007 1.005 - 1.030      pH (UA) 5.5 5.0 - 8.0      Protein NEGATIVE  NEG mg/dL    Glucose NEGATIVE  NEG mg/dL    Ketone NEGATIVE  NEG mg/dL    Bilirubin NEGATIVE  NEG      Blood NEGATIVE  NEG      Urobilinogen 0.2 0.2 - 1.0 EU/dL    Nitrites NEGATIVE  NEG      Leukocyte Esterase NEGATIVE  NEG     DRUG SCREEN, URINE    Collection Time: 06/13/19  7:40 PM   Result Value Ref Range    BENZODIAZEPINES NEGATIVE  NEG      BARBITURATES POSITIVE (A) NEG      THC (TH-CANNABINOL) NEGATIVE  NEG      OPIATES NEGATIVE  NEG      PCP(PHENCYCLIDINE) NEGATIVE  NEG      COCAINE NEGATIVE  NEG      AMPHETAMINES NEGATIVE  NEG      METHADONE NEGATIVE  NEG      HDSCOM (NOTE)    METABOLIC PANEL, BASIC    Collection Time: 06/14/19 12:30 AM   Result Value Ref Range    Sodium 129 (L) 136 - 145 mmol/L    Potassium 3.5 3.5 - 5.5 mmol/L    Chloride 88 (L) 100 - 108 mmol/L    CO2 32 21 - 32 mmol/L    Anion gap 9 3.0 - 18 mmol/L    Glucose 101 (H) 74 - 99 mg/dL    BUN 4 (L) 7.0 - 18 MG/DL    Creatinine 1.06 0.6 - 1.3 MG/DL    BUN/Creatinine ratio 4 (L) 12 - 20      GFR est AA >60 >60 ml/min/1.73m2    GFR est non-AA >60 >60 ml/min/1.73m2    Calcium 8.2 (L) 8.5 - 10.1 MG/DL   ETHYL ALCOHOL    Collection Time: 06/14/19 12:30 AM   Result Value Ref Range    ALCOHOL(ETHYL),SERUM <3 0 - 3 MG/DL           Disposition:    Diagnosis:   1. Alcohol abuse    2. Hypokalemia    3. Alcohol withdrawal syndrome with complication ([facility]        Disposition: Patient admitted to inpatient mental health unit        Patient's Medications   Start Taking    No medications on file   Continue Taking    ACETAMINOPHEN (TYLENOL) 325 MG TABLET    Take 2 Tabs by mouth every four (4) hours as needed for Pain. DOXEPIN (SINEQUAN) 10 MG CAPSULE    Take 1 Cap by mouth every evening. FOLIC ACID (FOLVITE) 1 MG TABLET    Take 1 Tab by mouth daily. LACTULOSE (CHRONULAC) 10 GRAM/15 ML SOLUTION    Take 15 mL by mouth daily as needed (constipation). MAGNESIUM OXIDE (MAG-OX) 400 MG TABLET    Take 1 Tab by mouth daily. METOPROLOL TARTRATE (LOPRESSOR) 25 MG TABLET    Take 0.5 Tabs by mouth two (2) times a day. NALTREXONE (DEPADE) 50 MG TABLET    Take 1 Tab by mouth daily. THERAPEUTIC MULTIVITAMIN (THERAGRAN) TABLET    Take 1 Tab by mouth daily.     THIAMINE HCL (B-1) 100 MG TABLET    Take 1 Tab by mouth daily.    VITAMIN E (AQUA GEMS) 400 UNIT CAPSULE    Take 400 Units by mouth daily. These Medications have changed    No medications on file   Stop Taking    No medications on file              Diana Lewis ENP-C,FNP-C      Dragon voice recognition was used to generate this report, which may have resulted in some phonetic based errors in grammar and contents.  Even though attempts were made to correct all the mistakes, some may have been missed, and remained in the body of the document

## 2019-06-15 LAB
AMMONIA PLAS-SCNC: 31 UMOL/L (ref 11–32)
ANION GAP SERPL CALC-SCNC: 7 MMOL/L (ref 3–18)
BUN SERPL-MCNC: 10 MG/DL (ref 7–18)
BUN/CREAT SERPL: 9 (ref 12–20)
CALCIUM SERPL-MCNC: 7.3 MG/DL (ref 8.5–10.1)
CHLORIDE SERPL-SCNC: 96 MMOL/L (ref 100–108)
CO2 SERPL-SCNC: 34 MMOL/L (ref 21–32)
CREAT SERPL-MCNC: 1.11 MG/DL (ref 0.6–1.3)
GLUCOSE SERPL-MCNC: 97 MG/DL (ref 74–99)
LIPASE SERPL-CCNC: 162 U/L (ref 73–393)
MAGNESIUM SERPL-MCNC: 1.6 MG/DL (ref 1.6–2.6)
POTASSIUM SERPL-SCNC: 3.5 MMOL/L (ref 3.5–5.5)
SODIUM SERPL-SCNC: 137 MMOL/L (ref 136–145)

## 2019-06-15 PROCEDURE — 83735 ASSAY OF MAGNESIUM: CPT

## 2019-06-15 PROCEDURE — 74011250637 HC RX REV CODE- 250/637: Performed by: PSYCHIATRY & NEUROLOGY

## 2019-06-15 PROCEDURE — 82140 ASSAY OF AMMONIA: CPT

## 2019-06-15 PROCEDURE — 80048 BASIC METABOLIC PNL TOTAL CA: CPT

## 2019-06-15 PROCEDURE — 65220000003 HC RM SEMIPRIVATE PSYCH

## 2019-06-15 PROCEDURE — 83690 ASSAY OF LIPASE: CPT

## 2019-06-15 PROCEDURE — 36415 COLL VENOUS BLD VENIPUNCTURE: CPT

## 2019-06-15 RX ORDER — IBUPROFEN 200 MG
1 TABLET ORAL DAILY
Status: DISCONTINUED | OUTPATIENT
Start: 2019-06-15 | End: 2019-06-17 | Stop reason: HOSPADM

## 2019-06-15 RX ORDER — LORAZEPAM 1 MG/1
2 TABLET ORAL EVERY 4 HOURS
Status: COMPLETED | OUTPATIENT
Start: 2019-06-15 | End: 2019-06-16

## 2019-06-15 RX ADMIN — Medication 100 MG: at 08:35

## 2019-06-15 RX ADMIN — TRAZODONE HYDROCHLORIDE 50 MG: 50 TABLET ORAL at 23:04

## 2019-06-15 RX ADMIN — FOLIC ACID 1 MG: 1 TABLET ORAL at 08:35

## 2019-06-15 RX ADMIN — THERA TABS 1 TABLET: TAB at 08:35

## 2019-06-15 RX ADMIN — LORAZEPAM 2 MG: 1 TABLET ORAL at 12:12

## 2019-06-15 RX ADMIN — LORAZEPAM 2 MG: 1 TABLET ORAL at 23:04

## 2019-06-15 RX ADMIN — LORAZEPAM 2 MG: 1 TABLET ORAL at 16:09

## 2019-06-15 RX ADMIN — LORAZEPAM 2 MG: 1 TABLET ORAL at 02:56

## 2019-06-15 RX ADMIN — HYDROXYZINE PAMOATE 50 MG: 50 CAPSULE ORAL at 08:38

## 2019-06-15 RX ADMIN — LORAZEPAM 2 MG: 1 TABLET ORAL at 20:06

## 2019-06-15 NOTE — BH NOTES
Pt out at intervals. Has complained to both staff and patients about his roommate at one point mentioned to staff calling previous roommate a 'SPOOK'. Talked about things he felt he couldn't do in room and was moved. Informed to now stopped talking about the situation since he got what he wanted. by doctor. Encouraged to focus on what he needs to do for self. .Did observe having some slight tremors and asking for medication. Out for meals and interacted minimally in negative way with peers complaining.

## 2019-06-15 NOTE — BH NOTES
Pt called his son to come to visit tomorrow. Pt isolated resting this shift. Pt was pleasant when in the day area. Pt. denies si/hi and avh today. Pt contracts for safety on the unit agree to come to staff if feeling harm to self or others. Pt. denies any new medical/pain complaints. Pt. did not have any visitors. Staff encouraged Pt. to  participate in treatment,  medication and group therapy. Pt agreed. Pt. remain free of falls and provided non skid socks. Staff will continue to monitor Pt. for behavior safety and location.

## 2019-06-15 NOTE — BH NOTES
GROUP THERAPY PROGRESS NOTE    Andriy Matamoros is participating in [place].      Group time: 30 minutes     Personal goal for participation: Setting daily goals     Goal orientation: community     Group therapy participation: active     Therapeutic interventions reviewed and discussed: Discussed the importance of setting daily short-term goals to help achieve the longer term goals.      Impression of participation: Patient discussed his goal of taking his medication daily.

## 2019-06-15 NOTE — BH NOTES
GROUP THERAPY PROGRESS NOTE    Karmen Rodas is participating in [place]. Group time: 15 minutes    Personal goal for participation:   Orientation to the unit    Goal orientation: community    Therapeutic interventions reviewed and discussed:   Unit guidelines and daily routine were reviewed. Patients were encouraged to voice their concerns and ask questions. Impression of participation:   Patient was attentive during group. Patient did not voice any questions or concerns.

## 2019-06-15 NOTE — BH NOTES
Zulema Shorty is not participating in [facility]. Group time: 2000     Personal goal for participation:  to follow up on goals set in the morning community group. Goal orientation: community     Group therapy participation:   non-active     Therapeutic interventions reviewed and discussed:  Pt not available to discuss if treatment goals were met  today. Impression of participation:   Pt  refused chose to rest in bed despite staff encouragement.

## 2019-06-15 NOTE — BH NOTES
Pt appeared to have slept for 6.25 hours. Q4 vitals assessed x 1; RN administered meds. Pt appears to be sleeping at this time. Will continue to monitor for safety.

## 2019-06-15 NOTE — PROGRESS NOTES
Behavioral Health Progress Note    Admit Date: 6/13/2019  Hospital day 1    Vitals :   Patient Vitals for the past 8 hrs:   BP Temp Pulse Resp   06/15/19 1216 132/88 96.9 °F (36.1 °C) (!) 102 16   06/15/19 0848 127/75 97.3 °F (36.3 °C) 96 16     Labs:    Recent Results (from the past 24 hour(s))   AMMONIA    Collection Time: 06/15/19  6:20 AM   Result Value Ref Range    Ammonia 31 11 - 32 UMOL/L   METABOLIC PANEL, BASIC    Collection Time: 06/15/19  6:50 AM   Result Value Ref Range    Sodium 137 136 - 145 mmol/L    Potassium 3.5 3.5 - 5.5 mmol/L    Chloride 96 (L) 100 - 108 mmol/L    CO2 34 (H) 21 - 32 mmol/L    Anion gap 7 3.0 - 18 mmol/L    Glucose 97 74 - 99 mg/dL    BUN 10 7.0 - 18 MG/DL    Creatinine 1.11 0.6 - 1.3 MG/DL    BUN/Creatinine ratio 9 (L) 12 - 20      GFR est AA >60 >60 ml/min/1.73m2    GFR est non-AA >60 >60 ml/min/1.73m2    Calcium 7.3 (L) 8.5 - 10.1 MG/DL   MAGNESIUM    Collection Time: 06/15/19  6:50 AM   Result Value Ref Range    Magnesium 1.6 1.6 - 2.6 mg/dL   LIPASE    Collection Time: 06/15/19  6:50 AM   Result Value Ref Range    Lipase 162 73 - 393 U/L     Meds:   Current Facility-Administered Medications   Medication Dose Route Frequency    nicotine (NICODERM CQ) 21 mg/24 hr patch 1 Patch  1 Patch TransDERmal DAILY    LORazepam (ATIVAN) tablet 2 mg  2 mg Oral Q4H    traZODone (DESYREL) tablet 50 mg  50 mg Oral QHS PRN    hydrOXYzine pamoate (VISTARIL) capsule 50 mg  50 mg Oral TID PRN    thiamine HCL (B-1) tablet 100 mg  100 mg Oral DAILY    haloperidol (HALDOL) tablet 5 mg  5 mg Oral Q6H PRN    haloperidol lactate (HALDOL) injection 5 mg  5 mg IntraMUSCular B6T PRN    folic acid (FOLVITE) tablet 1 mg  1 mg Oral DAILY    benztropine (COGENTIN) tablet 1 mg  1 mg Oral Q6H PRN    benztropine (COGENTIN) injection 1 mg  1 mg IntraMUSCular Q6H PRN    LORazepam (ATIVAN) tablet 2 mg  2 mg Oral Q4H PRN    LORazepam (ATIVAN) tablet 1 mg  1 mg Oral Q4H PRN    therapeutic multivitamin [facility]) tablet 1 Tab  1 Tab Oral DAILY      Hospital Problems: Principal Problem:    Alcohol dependence ([facility] (10/5/2017)    Active Problems:    Pancreatitis (9/8/2018)      Alcohol withdrawal ([facility] (3/26/2019)      Hypokalemia (5/04/5717)      Alcoholic liver disease ([facility].) (6/14/2019)        Subjective:   Medication side effects: fatigue/weakness, headache, GI irritation, anxiety  tremor    Mental Status Exam  Sensorium: alert  Orientation: only aware of  time, place and person  Relations: guarded  Eye Contact: intense  Appearance: shaing, constantly up and down  Thought Process: slow rate of thoughts and poor abstract reasoning/computation   Thought Content: no evidence of impairment   Suicidal: denies   Homicidal: none   Mood: is anxious and is irritable   Affect: anxious and iritable  Memory: is impaired and is recent     Concentration: distractable  Abstraction: concrete  Insight: The patient shows little insight    OR Fair  Judgement: is psychologically impaired OR  Fair    Assessment/Plan:   not changed  Having severe shakes, anxiety, irritability. Had required routine Lorazepam w/ recent detox in [place], [place]. Complains of pain in abdomen where he had pancreatic pain before. Problems w/ roommate, switching rooms. Continue detox  Continue close observation,   Medications: increasing Lorazepam 2mg every 4 hours as well as prn. Risks and benefits of medication were discussed. Potential medication side effects were discussed. Patient was given opportunity to ask questions.

## 2019-06-15 NOTE — BH NOTES
Ulices Watson is not participating in group therapy     Group time:  30 minutes     Personal goal for participation: interaction with staff and peers     Goal orientation: discussion of topic in an adult manor historic in nature     Group therapy participation: appropriate communication skills     Therapeutic interventions reviewed and discussed: did not interact with staff and peers appropriately     Impression of participation: did not participate

## 2019-06-15 NOTE — PROGRESS NOTES
Problem: Falls - Risk of  Goal: *Absence of Falls  Description  Document Rosendo Ugarte Fall Risk and appropriate interventions in the flowsheet. Outcome: Progressing Towards Goal  Note:   Fall Risk Interventions:            Medication Interventions: Assess postural VS orthostatic hypotension, Evaluate medications/consider consulting pharmacy, Teach patient to arise slowly            Pt was free from falls today       Problem: Anxiety-Behavioral Health (Adult/Pediatric)  Goal: *STG: Attends activities and groups  Description  Patient will be encouraged to attend groups daily. Outcome: Progressing Towards Goal  Note:   Pt attended groups today       Pt observed interacting with pees and staff appropriately. Pt has complained of alcohol withdrawal symptoms (see CIWA flowsheet) and has been medicated per protocol (see MAR). Pt denies suicidal and homicidal ideations and hallucinations. Pt has been compliant with his treatment plan. Pt reported having discharge plan to attend [facility] substance abuse rehab once discharged from [facility] Adult[facility]. Pt was encouraged to remain focused on his recovery goals and to reconnect with his support network while he is still in the hospital.  Pt has been free from falls today. Will continue to monitor pt for safety and will support pt towards tx plan compliance.

## 2019-06-16 PROCEDURE — 65220000003 HC RM SEMIPRIVATE PSYCH

## 2019-06-16 PROCEDURE — 74011250637 HC RX REV CODE- 250/637: Performed by: PSYCHIATRY & NEUROLOGY

## 2019-06-16 RX ORDER — ZIPRASIDONE HYDROCHLORIDE 40 MG/1
40 CAPSULE ORAL 2 TIMES DAILY WITH MEALS
Status: DISCONTINUED | OUTPATIENT
Start: 2019-06-16 | End: 2019-06-17 | Stop reason: HOSPADM

## 2019-06-16 RX ORDER — LORAZEPAM 1 MG/1
2 TABLET ORAL EVERY 6 HOURS
Status: DISCONTINUED | OUTPATIENT
Start: 2019-06-17 | End: 2019-06-17

## 2019-06-16 RX ORDER — DICYCLOMINE HYDROCHLORIDE 10 MG/1
20 CAPSULE ORAL 2 TIMES DAILY
Status: DISCONTINUED | OUTPATIENT
Start: 2019-06-16 | End: 2019-06-17

## 2019-06-16 RX ADMIN — LORAZEPAM 2 MG: 1 TABLET ORAL at 07:58

## 2019-06-16 RX ADMIN — LORAZEPAM 2 MG: 1 TABLET ORAL at 04:20

## 2019-06-16 RX ADMIN — LORAZEPAM 2 MG: 1 TABLET ORAL at 16:14

## 2019-06-16 RX ADMIN — TRAZODONE HYDROCHLORIDE 50 MG: 50 TABLET ORAL at 20:17

## 2019-06-16 RX ADMIN — LORAZEPAM 2 MG: 1 TABLET ORAL at 20:16

## 2019-06-16 RX ADMIN — ZIPRASIDONE HYDROCHLORIDE 40 MG: 20 CAPSULE ORAL at 17:49

## 2019-06-16 RX ADMIN — Medication 100 MG: at 08:00

## 2019-06-16 RX ADMIN — DICYCLOMINE HYDROCHLORIDE 20 MG: 10 CAPSULE ORAL at 20:16

## 2019-06-16 RX ADMIN — FOLIC ACID 1 MG: 1 TABLET ORAL at 08:01

## 2019-06-16 RX ADMIN — LORAZEPAM 2 MG: 1 TABLET ORAL at 11:37

## 2019-06-16 RX ADMIN — HYDROXYZINE PAMOATE 50 MG: 50 CAPSULE ORAL at 00:54

## 2019-06-16 RX ADMIN — THERA TABS 1 TABLET: TAB at 08:01

## 2019-06-16 NOTE — PROGRESS NOTES
Behavioral Health Progress Note    Admit Date: 6/13/2019  Hospital day 2    Vitals :   Patient Vitals for the past 8 hrs:   BP Temp Pulse Resp   06/16/19 0800 133/81 98.9 °F (37.2 °C) 99 18   06/16/19 0415 (!) 136/94 98 °F (36.7 °C) 77 14     Labs:  No results found for this or any previous visit (from the past 24 hour(s)).   Meds:   Current Facility-Administered Medications   Medication Dose Route Frequency    ziprasidone (GEODON) capsule 40 mg  40 mg Oral BID WITH MEALS    dicyclomine (BENTYL) capsule 20 mg  20 mg Oral BID    [START ON 6/17/2019] LORazepam (ATIVAN) tablet 2 mg  2 mg Oral Q6H    nicotine (NICODERM CQ) 21 mg/24 hr patch 1 Patch  1 Patch TransDERmal DAILY    LORazepam (ATIVAN) tablet 2 mg  2 mg Oral Q4H    traZODone (DESYREL) tablet 50 mg  50 mg Oral QHS PRN    hydrOXYzine pamoate (VISTARIL) capsule 50 mg  50 mg Oral TID PRN    thiamine HCL (B-1) tablet 100 mg  100 mg Oral DAILY    folic acid (FOLVITE) tablet 1 mg  1 mg Oral DAILY    benztropine (COGENTIN) tablet 1 mg  1 mg Oral Q6H PRN    benztropine (COGENTIN) injection 1 mg  1 mg IntraMUSCular Q6H PRN    LORazepam (ATIVAN) tablet 2 mg  2 mg Oral Q4H PRN    LORazepam (ATIVAN) tablet 1 mg  1 mg Oral Q4H PRN    therapeutic multivitamin (THERAGRAN) tablet 1 Tab  1 Tab Oral DAILY      Hospital Problems: Principal Problem:    Alcohol dependence ([facility] (10/5/2017)    Active Problems:    Pancreatitis (9/8/2018)      Alcohol withdrawal ([facility] (3/26/2019)      Hypokalemia (4/42/7860)      Alcoholic liver disease ([facility] (6/14/2019)        Subjective:   Medication side effects: GI irritation  anxiety    Mental Status Exam  Sensorium: alert  Orientation: only aware of  time, place and person  Relations: cooperative  Eye Contact: appropriate  Appearance: shows no evidence of impairment  Thought Process: normal rate of thoughts and fair abstract reasoning/computation   Thought Content: no evidence of impairment   Suicidal: denies   Homicidal: none Mood: is anxious   Affect: stable  Memory: shows no evidence of impairment     Concentration: distractable  Abstraction: concrete  Insight: The patient's insight shows no evidence of impairment    OR Fair  Judgement: shows no evidence of impairment OR  Fair    Assessment/Plan:   improved  Pt found his paperwork from [facility] from [facility]. Was supposed to be on Dicyclomine 20 qid PRN and was taking it bid and was on Geodon 40 mg bid. Did not F/U w/ anyone and ran out of meds. Says it had been helping, wants to resume. Will write for bid Dicyclomine and the Geodon. Withdrawal is better, will cut to Lorazepam 2 mg q 6 hours tonight.   Continue close observation, detox

## 2019-06-16 NOTE — BH NOTES
6148 patient requested and received vistaril 50 mg PO for anxiety related to not being able to get a sound sleep. Medication helpful according to patient. 0415 ciwa score 6. Scheduled ativan given with good results. Slept 4.5 hours this shift.

## 2019-06-16 NOTE — BH NOTES
GROUP THERAPY PROGRESS NOTE    Ramona Bailey is not participating in Coping Skills Group. Group time: 45 minutes    Personal goal for participation: To identify coping skills to use to deal with uncomfortable feelings and emotions. Goal orientation: personal    Group therapy participation: active    Therapeutic interventions reviewed and discussed: We discussed healthy versus unhealthy coping skills. We talked about using things that please our five senses as a coping skill and each patient was encouraged to list examples for each. We listened to \"Stressed Out\" by [employer] and identified coping skills mentioned in song. Impression of participation:   Vladimir Shah was attentive during group. He openly shared with the group.

## 2019-06-16 NOTE — PROGRESS NOTES
Problem: Falls - Risk of  Goal: *Absence of Falls  Description  Document Catherene Bunting Fall Risk and appropriate interventions in the flowsheet. Outcome: Progressing Towards Goal  Note:   Fall Risk Interventions:            Medication Interventions: Teach patient to arise slowly     Pt free from falls today              Problem: Alcohol Withdrawal  Goal: *STG: Complies with medication therapy  Description  Patient will be given medication per CIWA protocol daily   Outcome: Progressing Towards Goal  Note:   Pt medication compliant today         Pt presents smiling with euthymic mood. Pt shows no impairment r/t scheduled ativan detox protocol for etoh withdrawal.  Pt denies si/hi and avh. Pt has been medication and meal compliant. Pt has been cooperative. Pt has been free from falls. Will continue to monitor for safety and support towards tx plan compliance.

## 2019-06-16 NOTE — BH NOTES
GROUP THERAPY PROGRESS NOTE    Satya Barrios is participating in [place]. Group time: 30 minutes    Personal goal for participation: patient goal sheet    Goal orientation: community    Group therapy participation: active    Therapeutic interventions reviewed and discussed: Patient goal was to feel good for today.     Impression of participation:   Patient participated

## 2019-06-16 NOTE — BH NOTES
MHT Note:  Patient has been in bed most of the day. He did participate in unit activities. Otherwise, he comes out to the day area to eat and then right back to bed. He has been cooperative with staff this shift. No management problems. Staff will continue to monitor for safety and location.

## 2019-06-17 VITALS
WEIGHT: 185 LBS | HEART RATE: 94 BPM | SYSTOLIC BLOOD PRESSURE: 135 MMHG | TEMPERATURE: 96.5 F | OXYGEN SATURATION: 95 % | HEIGHT: 68 IN | RESPIRATION RATE: 16 BRPM | BODY MASS INDEX: 28.04 KG/M2 | DIASTOLIC BLOOD PRESSURE: 97 MMHG

## 2019-06-17 PROCEDURE — 74011250637 HC RX REV CODE- 250/637: Performed by: PSYCHIATRY & NEUROLOGY

## 2019-06-17 RX ORDER — ZIPRASIDONE HYDROCHLORIDE 40 MG/1
40 CAPSULE ORAL 2 TIMES DAILY WITH MEALS
Qty: 60 CAP | Refills: 0 | Status: SHIPPED | OUTPATIENT
Start: 2019-06-17

## 2019-06-17 RX ORDER — HYDROXYZINE 50 MG/1
50 TABLET, FILM COATED ORAL
Status: DISCONTINUED | OUTPATIENT
Start: 2019-06-17 | End: 2019-06-17 | Stop reason: HOSPADM

## 2019-06-17 RX ADMIN — LORAZEPAM 2 MG: 1 TABLET ORAL at 06:08

## 2019-06-17 RX ADMIN — ZIPRASIDONE HYDROCHLORIDE 40 MG: 20 CAPSULE ORAL at 08:55

## 2019-06-17 RX ADMIN — DICYCLOMINE HYDROCHLORIDE 20 MG: 10 CAPSULE ORAL at 08:55

## 2019-06-17 RX ADMIN — FOLIC ACID 1 MG: 1 TABLET ORAL at 08:55

## 2019-06-17 RX ADMIN — Medication 100 MG: at 08:55

## 2019-06-17 RX ADMIN — THERA TABS 1 TABLET: TAB at 08:55

## 2019-06-17 NOTE — BH NOTES
GROUP THERAPY PROGRESS NOTE    [facility] Group time: 30 minutes    Personal goal for participation: DOING GOOD    Goal orientation: personal    Group therapy participation: minimal    Therapeutic interventions reviewed and discussed: Pt mentioned that he wants to try to do good and to stop drinking so much. and try to get some help. Hoping to get into [facility] afraid if not will relapse again.     Impression of participation: Pt attended and completed goal.

## 2019-06-17 NOTE — DISCHARGE SUMMARY
[address] [facility]    Name:  Chika White  MR#:   [id number]  :  1965  ACCOUNT #:  [de-identified]  ADMIT DATE:  2019  DISCHARGE DATE:  2019    IDENTIFYING DATA:  The patient presented as a 55-year-old  white male, resident of [facility]. He was admitted after self-presentation to the emergency room requesting alcohol detoxification for his 30-year history of alcoholism. He had been accepted to an alcohol rehabilitation facility, but they were requiring detoxification before he entered there. He did have a history of seizures in the past.  He would drink a half a gallon of rum all day. He was having shaking, sweating, nausea and would take a drink to stop this. He had a past history of alcohol liver disease and alcoholic pancreatitis, though he was denying recent complaints. He has a history of being on Campral prescribed though he was not aware of it. He said he had drunk when he was on Antabuse. He has had two DUIs and two drunk in public charges in the past and has had blackouts. Physical examination had showed blood pressure 135/73 in the emergency room. Pulse 98, respirations 16, temperature 98.6 degrees, pO2 of 93%. Physical examination had been totally normal.  A review of his current condition shows him to have a distended lower abdomen, and he was quite shaky as he would walk. He continued to have hand tremors. Laboratory testing had included a normal CBC. He had mild anemia last month. Urinalysis showed a dilute specimen. Comprehensive metabolic panel showed decreased potassium 2.7 mmol/L, decreased sodium 127 mmol/L, decreased chloride 86 mmol/L, decreased calcium 7.7 mg/dL, decreased magnesium 1.1 mg/dL, decreased albumin 2.9 g/dL, increased AST 40 units/L. Urine drug screen was positive for barbiturates. Alcohol level showed intoxicated condition with an alcohol level of 158 mg/dL.   Recent abdominal ultrasound on 04/21/2019 showed him to have hepatomegaly with increased hepatic echotexture suggesting a nonspecific hepatocellular pathology and a mildly prominent gallbladder wall probably secondary to hepatocellular pathology. Hepatitis panel had been negative. HOSPITAL COURSE:  The patient was admitted to the locked adult unit where he was afforded individual, group and milieu therapies with his detoxification. He did have a repeat chemistry profile on 06/15/2019, and his sodium and potassium had stabilized. Chloride was still low at 96 mmol/L and calcium still low at 7.3 mg/dL. Magnesium was low normal and eGFR normal.  Lipase was normal at 162 units/L and ammonia level normal at 31 micromoles/L. Blood glucose had been normal at 97 mg/dL. Review of vital signs showed that he had consistently mildly elevated blood pressures with the most recent one 135/97 with pulse 94, temperature 96.5 degrees, respirations 16. While in the hospital, he was treated with his dicyclomine 20 mg two doses, folic acid 1 mg daily, hydroxyzine 50 mg as needed for anxiety for a total of three doses, and lorazepam p.r.n. doses, he ended up being placed on 2 mg every four hours for significant withdrawal with tremors, gastrointestinal symptoms. He was taking nicotine patch 21 mg daily, multiple vitamins daily, thiamine 100 mg daily, trazodone 50 mg at night for sleep. He brought in his list of outpatient medicines which included the dicyclomine and ziprasidone 40 mg twice a day with meals. He was restarted back on his ziprasidone which he said helped significantly with his anxiety. He had received magnesium sulfate 2 g in the emergency room, potassium in his IV, sodium chloride bolus in the emergency room. Mood improved in the structure of the hospital.  He was denying hallucinations or delusions.   His withdrawal symptoms had abated, and he had refused his lorazepam on the morning of discharge, stating he was no longer having withdrawal symptoms and wished to be discharged to self so that he could enter into the treatment program at [facility]. He was in contact with his counselor there, who will be picking him up. He was making arrangements to pay them out of pocket. It was discussed with him about possibly using his insurance, but the only places that his insurance would accept were in other states and he did not wish to go there. There was no evidence of hallucinatory or delusional material.    CONDITION ON DISCHARGE:  Fair. PROGNOSIS:  Fair. ASSESSMENT:  AXIS I:  Alcohol use disorder, severe. Alcohol intoxication, resolved. Alcohol withdrawal syndrome. Nicotine use disorder, severe. AXIS II:  None. AXIS III:  Alcohol liver disease. Alcoholic pancreatitis, resolved. Hypomagnesemia, resolved. Hypocalcemia. Hyponatremia, resolved. Hypochloremia. Hypokalemia, resolved. DISPOSITION:  Discharge to self. Follow up in the [facility].  Follow up with own primary care provider as needed. Follow up after his rehabilitation with Chet Mendiola, [de-identified] assistant at [facility] ([facility]). Recommend to follow up with AA. DISCHARGE MEDICATIONS:  1.  Dicyclomine 20 mg b.i.d. as needed for abdominal cramping. 2.  Lactulose as needed for constipation and alcohol liver disease. 3.  Multiple vitamins with iron one p.o. daily. 4.  Geodon (ziprasidone) 40 mg capsule, one daily with meals, #60, no refill. 5.  Naltrexone 50 mg at bedtime for alcohol dependence.       Miracle Linda MD      [id number]  D:  06/17/2019 10:42  T:  06/17/2019 17:58  JOB #:  [id number]

## 2019-06-17 NOTE — DISCHARGE INSTRUCTIONS
BEHAVIORAL HEALTH NURSING DISCHARGE NOTE      The following personal items collected during your admission are returned to you:   Dental Appliance: Dental Appliances: None  Vision: Visual Aid: Glasses, With patient  Hearing Aid:    Jewelry: Jewelry: Watch(requested to wear watch)  Clothing: Clothing: Belt, Footwear, Shirt, Shorts, Undergarments  Other Valuables: Other Valuables: Cell Phone, Cigarettes, Eyeglasses, Lighter/matches, Money (comment), Personal electronic devices (comment), Wallet, Other (comment)(money sent to safe, phone )  Valuables sent to safe: Personal Items Sent to Safe: wallet,ID,Debit card, $63.58      PATIENT INSTRUCTIONS:    Your health and safety is very important to us; we remind you to commit to safety and recovery. Should you have any thoughts of harming yourself or others please dial 911, utilize your support systems, the coping strategies you have learned as well as the [address] [place] [address] at [phone] or visit their website at https://suicidepreventionlifeline.org/    The following are some additional coping strategies that you can utilize if you start to feel anxious, angry, stressed or depressed    1. Exercise (running, walking, etc.). 2. Write (poetry, stories, journal). 3. Be with other people. 4. Watch a favorite TV show. 5. Practice Mindfulness  6. Watch a funny/favorite movie  7. Do some deep breathing  8. Take a hot shower or relaxing bath. 9. Play with a pet. 10. Help others  11. Read a good book. 12. Listen to music. 13. Try some aromatherapy (candle, lotion, room spray). 14. Meditate. 15. Paint or draw. 16. Rip paper into itty-bitty pieces. 17. Shoot hoops, kick a ball. 18. Hug a pillow or stuffed animal.  19. Dance. 20. Take up a new hobby. 21. [place]. 25. Make a list of blessings in your life. 23. Contact a hotline/ your therapist.  24. Talk to someone close to you. 25. Yoga. 32. [place] a friend or family member.   32. Make a list of goals for the week/month/year/5 years. The discharge information has been reviewed with the patient. The patient verbalized understanding.       Patient armband removed and shredded      ***IMPORTANT NUMBERS***        [address] [facility]        [phone] [address] [facility]       [phone]    Suicide Prevention     [phone]

## 2019-06-17 NOTE — BSMART NOTE
SW Contact:   Reviewed with pt his goal to enter [facility] at   [address]  # [phone]   Morteza Krishnan. Pt release signed & records faxed to # [phone]. AWAITING their decision to accept pt.

## 2019-09-23 ENCOUNTER — HOSPITAL ENCOUNTER (OUTPATIENT)
Dept: CT IMAGING | Age: 54
Discharge: HOME | End: 2019-09-23
Attending: FAMILY MEDICINE
Payer: COMMERCIAL

## 2019-09-23 DIAGNOSIS — N13.30 HYDRONEPHROSIS: ICD-10-CM

## 2019-09-23 DIAGNOSIS — N32.89 BLADDER WALL THICKENING: ICD-10-CM

## 2019-09-23 DIAGNOSIS — K74.60 LIVER CIRRHOSIS (HCC): ICD-10-CM

## 2019-09-23 DIAGNOSIS — K76.0 FATTY LIVER: ICD-10-CM

## 2019-09-23 LAB — CREAT UR-MCNC: 0.9 MG/DL (ref 0.6–1.3)

## 2019-09-23 PROCEDURE — 82565 ASSAY OF CREATININE: CPT

## 2019-09-23 PROCEDURE — 74177 CT ABD & PELVIS W/CONTRAST: CPT

## 2019-09-23 PROCEDURE — 74011636320 HC RX REV CODE- 636/320

## 2019-09-23 RX ADMIN — IOPAMIDOL 100 ML: 612 INJECTION, SOLUTION INTRAVENOUS at 10:00

## 2019-10-21 ENCOUNTER — OFFICE VISIT (OUTPATIENT)
Dept: UROLOGY | Age: 54
End: 2019-10-21

## 2019-10-21 VITALS
DIASTOLIC BLOOD PRESSURE: 68 MMHG | HEIGHT: 68 IN | HEART RATE: 89 BPM | OXYGEN SATURATION: 98 % | SYSTOLIC BLOOD PRESSURE: 115 MMHG | BODY MASS INDEX: 28.95 KG/M2 | WEIGHT: 191 LBS

## 2019-10-21 DIAGNOSIS — N40.0 BENIGN PROSTATIC HYPERPLASIA, UNSPECIFIED WHETHER LOWER URINARY TRACT SYMPTOMS PRESENT: ICD-10-CM

## 2019-10-21 DIAGNOSIS — N13.30 HYDRONEPHROSIS, UNSPECIFIED HYDRONEPHROSIS TYPE: ICD-10-CM

## 2019-10-21 DIAGNOSIS — N13.5 UPJ OBSTRUCTION, ACQUIRED: Primary | ICD-10-CM

## 2019-10-21 LAB
BILIRUB UR QL STRIP: NORMAL
GLUCOSE UR-MCNC: NEGATIVE MG/DL
KETONES P FAST UR STRIP-MCNC: NEGATIVE MG/DL
PH UR STRIP: 6 [PH] (ref 4.6–8)
PROT UR QL STRIP: NEGATIVE
SP GR UR STRIP: 1.02 (ref 1–1.03)
UA UROBILINOGEN AMB POC: NORMAL (ref 0.2–1)
URINALYSIS CLARITY POC: CLEAR
URINALYSIS COLOR POC: YELLOW
URINE BLOOD POC: NEGATIVE
URINE LEUKOCYTES POC: NEGATIVE
URINE NITRITES POC: NEGATIVE

## 2019-10-21 RX ORDER — BUSPIRONE HYDROCHLORIDE 15 MG/1
15 TABLET ORAL 2 TIMES DAILY
COMMUNITY
Start: 2019-09-26

## 2019-10-21 RX ORDER — FOLIC ACID 1 MG/1
1 TABLET ORAL
COMMUNITY
Start: 2019-09-26

## 2019-10-21 RX ORDER — LANOLIN ALCOHOL/MO/W.PET/CERES
CREAM (GRAM) TOPICAL
Refills: 0 | COMMUNITY
Start: 2019-09-26

## 2019-10-21 NOTE — PROGRESS NOTES
Mr. Rosanne Lam has a reminder for a \"due or due soon\" health maintenance. I have asked that he contact his primary care provider for follow-up on this health maintenance.

## 2019-10-21 NOTE — PATIENT INSTRUCTIONS
Learning About Hydronephrosis  What is hydronephrosis? Hydronephrosis is swelling of the kidneys. It is caused by a buildup of urine. This condition can happen if a tube that drains urine from your kidneys is blocked. The blockage can come from within the urinary tract or from pressure outside of the tract. Pregnancy is an example of an outside (external) cause. This condition is often caused by a blockage such as a kidney stone, tumor, or blood clot. It also can be caused by a problem in your urinary system that you were born with (congenital problem). What are the symptoms? Some of the common symptoms are:  · Pain in one or both sides. · Stomach pain. · Blood in your urine. Some people have no symptoms. How is it diagnosed? Your doctor will do an ultrasound to look for a blockage in your urinary system. An ultrasound allows your doctor to see a picture of the organs and other structures in your belly (abdomen). You also may need blood and urine tests. How is it treated? Your treatment depends on the cause of the swelling. If it is caused by a blockage, your treatment will depend on the type of blockage you have. If the blockage is caused by a kidney stone, you may wait for the stone to pass. If hydronephrosis happens during pregnancy, it usually clears up on its own. You may need to have urine drained from your bladder or kidneys. A urinary catheter is a small, flexible tube that can be inserted through the urethra and into the bladder, allowing urine to drain. A nephrostomy catheter is a thin tube placed into your kidney to drain urine. Sometimes surgery is needed to clear the blockage. If you have a blockage, you should begin to feel better after the blockage is gone. Many people recover and have no long-term problems. But some may have kidney damage. If hydronephrosis was left untreated for a long time, the damage can be severe. Severe damage will require further treatment.   Follow-up care is a key part of your treatment and safety. Be sure to make and go to all appointments, and call your doctor if you are having problems. It's also a good idea to know your test results and keep a list of the medicines you take. Where can you learn more? Go to http://jt-julio.info/. Enter S386 in the search box to learn more about \"Learning About Hydronephrosis. \"  Current as of: October 31, 2018  Content Version: 12.2  © 4714-0652 [employer]. Care instructions adapted under license by [employer] (which disclaims liability or warranty for this information). If you have questions about a medical condition or this instruction, always ask your healthcare professional. [address] any warranty or liability for your use of this information.

## 2019-10-21 NOTE — PROGRESS NOTES
Chief Complaint   Patient presents with    New Patient    Hydronephrosis       HISTORY OF PRESENT ILLNESS:  Karen Noyola is a 49 y.o. male who presents today for evaluation of some hydronephrosis noted on CT scan. I have reviewed the CT scan and this indicates a left-sided UPJ obstruction although the ureter is visualized as it leaves the renal pelvis. There is no internal hydronephrosis so this is an acquired obstruction over the last several years and I have reviewed CT scans back to 2017 where it was still present. He does complain of a little pain here and there but certainly nothing specific and not bad enough to warrant surgery in my opinion. He has no urinary symptoms otherwise. And has no family members with prostate cancer that he is aware of. ROS    Past Medical History:   Diagnosis Date    Alcohol abuse        Past Surgical History:   Procedure Laterality Date    HX ORTHOPAEDIC         Social History     Tobacco Use    Smoking status: Current Every Day Smoker     Packs/day: 2.00    Smokeless tobacco: Never Used   Substance Use Topics    Alcohol use: Not Currently     Alcohol/week: 4.0 standard drinks     Types: 4 Cans of beer per week     Comment: 12 beers a day-fifth bourbon    Drug use: No       Allergies   Allergen Reactions    Amoxicillin Unknown (comments)       No family history on file. Current Outpatient Medications   Medication Sig Dispense Refill    busPIRone (BUSPAR) 5 mg tablet Take 2 Tabs by mouth.  folic acid (FOLVITE) 1 mg tablet Take 1 Tab by mouth.  magnesium oxide (MAG-OX) 400 mg tablet TAKE 1 TABLET BY MOUTH ONCE DAILY  0    vitamin B complex (B-100 PO) Take  by mouth.  vitamin E acetate (VITAMIN E PO) Take  by mouth.  therapeutic multivitamin (THERAGRAN) tablet Take 1 Tab by mouth daily. 30 Tab 0    dicyclomine (BENTYL) 20 mg tablet Take 20 mg by mouth every six (6) hours.       ziprasidone (GEODON) 40 mg capsule Take 1 Cap by mouth two (2) times daily (with meals). Indications: Additional Medications to Treat Depression 60 Cap 0    lactulose (CHRONULAC) 10 gram/15 mL solution Take 15 mL by mouth daily as needed (constipation). 1 Bottle 0    metoprolol tartrate (LOPRESSOR) 25 mg tablet Take 0.5 Tabs by mouth two (2) times a day. 60 Tab 0    naltrexone (DEPADE) 50 mg tablet Take 1 Tab by mouth daily. 30 Tab 3                 PHYSICAL EXAMINATION:   Visit Vitals  /68 (BP 1 Location: Right arm, BP Patient Position: Sitting)   Pulse 89   Ht 5' 8\" (1.727 m)   Wt 191 lb (86.6 kg)   SpO2 98%   BMI 29.04 kg/m²     Constitutional: WDWN, Pleasant and appropriate affect, No acute distress. CV:  No peripheral swelling noted  Respiratory: No respiratory distress or difficulties  Abdomen:  No abdominal masses or tenderness. No CVA tenderness. No inguinal hernias noted.  Male:    STEPHANIE:Perineum normal to visual inspection, no erythema or irritation, normal sphincter tone with no rectal lesions. The prostate is about 30 g in size but benign in nature with no nodularity or induration. SCROTUM:  No scrotal rash or lesions noticed. Normal bilateral testes and epididymis. PENIS: Urethral meatus normal in location and size. No urethral discharge. Skin: No jaundice. Neuro/Psych:  Alert and oriented x 3, affect appropriate. Lymphatic:   No enlarged inguinal lymph nodes.          Results for orders placed or performed in visit on 10/21/19   AMB POC URINALYSIS DIP STICK AUTO W/O MICRO   Result Value Ref Range    Color (UA POC) Yellow     Clarity (UA POC) Clear     Glucose (UA POC) Negative Negative    Bilirubin (UA POC) 1+ Negative    Ketones (UA POC) Negative Negative    Specific gravity (UA POC) 1.025 1.001 - 1.035    Blood (UA POC) Negative Negative    pH (UA POC) 6.0 4.6 - 8.0    Protein (UA POC) Negative Negative    Urobilinogen (UA POC) 0.2 mg/dL 0.2 - 1    Nitrites (UA POC) Negative Negative    Leukocyte esterase (UA POC) Negative Negative         REVIEW OF LABS AND IMAGING:     Imaging Report Reviewed? YES     Images Reviewed? YES           Other Lab Data Reviewed? YES         ASSESSMENT:     ICD-10-CM ICD-9-CM    1. UPJ obstruction, acquired N13.5 593.4    2. Benign prostatic hyperplasia, unspecified whether lower urinary tract symptoms present N40.0 600.00 PSA, DIAGNOSTIC (PROSTATE SPECIFIC AG)   3. Hydronephrosis, unspecified hydronephrosis type N13.30 591 AMB POC URINALYSIS DIP STICK AUTO W/O MICRO            PLAN / DISCUSSION: : I have gone over the CT scans with him at length and in my opinion, this acquired UPJ obstruction is not serious enough at this time to recommend surgery. I have recommended that we follow this with annual renal ultrasounds and make recommendations as we go along. I am going to repeat a PSA on him today because I cannot find one on the chart. If no further problems, I will see him back in 1 year          The patient expresses understanding and agreement of the discussion and plan. Diana Guerrier MD on 10/21/2019         Please note:  Voice recognition was used to generate this report, which may have resulted in some phonetic based errors in grammar and contents. Even though attempts were made to correct all the mistakes, some may have been missed, and remained in the body of the document.

## 2019-10-25 ENCOUNTER — HOSPITAL ENCOUNTER (OUTPATIENT)
Age: 54
Discharge: HOME | End: 2019-10-25
Attending: INTERNAL MEDICINE
Payer: COMMERCIAL

## 2019-10-25 ENCOUNTER — HOSPITAL ENCOUNTER (OUTPATIENT)
Dept: LAB | Age: 54
Discharge: HOME | End: 2019-10-25
Payer: COMMERCIAL

## 2019-10-25 DIAGNOSIS — N40.0 BENIGN PROSTATIC HYPERPLASIA, UNSPECIFIED WHETHER LOWER URINARY TRACT SYMPTOMS PRESENT: ICD-10-CM

## 2019-10-25 DIAGNOSIS — K76.0 STEATOSIS OF LIVER: ICD-10-CM

## 2019-10-25 DIAGNOSIS — R10.11 RIGHT UPPER QUADRANT PAIN: ICD-10-CM

## 2019-10-25 DIAGNOSIS — R93.89 ABNORMAL CT SCAN: ICD-10-CM

## 2019-10-25 LAB
CREAT UR-MCNC: 0.8 MG/DL (ref 0.6–1.3)
PSA SERPL-MCNC: 0.7 NG/ML (ref 0–4)

## 2019-10-25 PROCEDURE — 82565 ASSAY OF CREATININE: CPT

## 2019-10-25 PROCEDURE — 36415 COLL VENOUS BLD VENIPUNCTURE: CPT

## 2019-10-25 PROCEDURE — 74183 MRI ABD W/O CNTR FLWD CNTR: CPT

## 2019-10-25 PROCEDURE — 84153 ASSAY OF PSA TOTAL: CPT

## 2019-10-25 PROCEDURE — 74011250636 HC RX REV CODE- 250/636: Performed by: INTERNAL MEDICINE

## 2019-10-25 PROCEDURE — A9577 INJ MULTIHANCE: HCPCS | Performed by: INTERNAL MEDICINE

## 2019-10-25 RX ADMIN — GADOBENATE DIMEGLUMINE 20 ML: 529 INJECTION, SOLUTION INTRAVENOUS at 09:00

## 2019-11-22 ENCOUNTER — DOCUMENTATION ONLY (OUTPATIENT)
Dept: UROLOGY | Age: 54
End: 2019-11-22

## 2019-12-12 ENCOUNTER — ANESTHESIA EVENT (OUTPATIENT)
Dept: ENDOSCOPY | Age: 54
End: 2019-12-12
Payer: COMMERCIAL

## 2019-12-13 ENCOUNTER — HOSPITAL ENCOUNTER (OUTPATIENT)
Age: 54
Setting detail: OUTPATIENT SURGERY
Discharge: HOME | End: 2019-12-13
Attending: INTERNAL MEDICINE | Admitting: INTERNAL MEDICINE
Payer: COMMERCIAL

## 2019-12-13 ENCOUNTER — ANESTHESIA (OUTPATIENT)
Dept: ENDOSCOPY | Age: 54
End: 2019-12-13
Payer: COMMERCIAL

## 2019-12-13 VITALS
OXYGEN SATURATION: 99 % | TEMPERATURE: 97.2 F | SYSTOLIC BLOOD PRESSURE: 122 MMHG | RESPIRATION RATE: 14 BRPM | HEART RATE: 71 BPM | HEIGHT: 68 IN | WEIGHT: 178 LBS | DIASTOLIC BLOOD PRESSURE: 84 MMHG | BODY MASS INDEX: 26.98 KG/M2

## 2019-12-13 PROCEDURE — 77030019988 HC FCPS ENDOSC DISP BSC -B: Performed by: INTERNAL MEDICINE

## 2019-12-13 PROCEDURE — 74011000250 HC RX REV CODE- 250: Performed by: NURSE ANESTHETIST, CERTIFIED REGISTERED

## 2019-12-13 PROCEDURE — 77030029384 HC SNR POLYP CAPTVR II BSC -B: Performed by: INTERNAL MEDICINE

## 2019-12-13 PROCEDURE — 77030013992 HC SNR POLYP ENDOSC BSC -B: Performed by: INTERNAL MEDICINE

## 2019-12-13 PROCEDURE — 74011250636 HC RX REV CODE- 250/636: Performed by: NURSE ANESTHETIST, CERTIFIED REGISTERED

## 2019-12-13 PROCEDURE — 77030021593 HC FCPS BIOP ENDOSC BSC -A: Performed by: INTERNAL MEDICINE

## 2019-12-13 PROCEDURE — 88305 TISSUE EXAM BY PATHOLOGIST: CPT

## 2019-12-13 PROCEDURE — 77030018846 HC SOL IRR STRL H20 ICUM -A: Performed by: INTERNAL MEDICINE

## 2019-12-13 PROCEDURE — 76040000007: Performed by: INTERNAL MEDICINE

## 2019-12-13 PROCEDURE — 77030008565 HC TBNG SUC IRR ERBE -B: Performed by: INTERNAL MEDICINE

## 2019-12-13 PROCEDURE — 76060000032 HC ANESTHESIA 0.5 TO 1 HR: Performed by: INTERNAL MEDICINE

## 2019-12-13 RX ORDER — SODIUM CHLORIDE 0.9 % (FLUSH) 0.9 %
5-40 SYRINGE (ML) INJECTION AS NEEDED
Status: DISCONTINUED | OUTPATIENT
Start: 2019-12-13 | End: 2019-12-13 | Stop reason: HOSPADM

## 2019-12-13 RX ORDER — SODIUM CHLORIDE, SODIUM LACTATE, POTASSIUM CHLORIDE, CALCIUM CHLORIDE 600; 310; 30; 20 MG/100ML; MG/100ML; MG/100ML; MG/100ML
75 INJECTION, SOLUTION INTRAVENOUS CONTINUOUS
Status: DISCONTINUED | OUTPATIENT
Start: 2019-12-13 | End: 2019-12-13 | Stop reason: HOSPADM

## 2019-12-13 RX ORDER — LIDOCAINE HYDROCHLORIDE 10 MG/ML
0.1 INJECTION, SOLUTION EPIDURAL; INFILTRATION; INTRACAUDAL; PERINEURAL AS NEEDED
Status: DISCONTINUED | OUTPATIENT
Start: 2019-12-13 | End: 2019-12-13 | Stop reason: HOSPADM

## 2019-12-13 RX ORDER — PROPOFOL 10 MG/ML
INJECTION, EMULSION INTRAVENOUS AS NEEDED
Status: DISCONTINUED | OUTPATIENT
Start: 2019-12-13 | End: 2019-12-13 | Stop reason: HOSPADM

## 2019-12-13 RX ORDER — SODIUM CHLORIDE 0.9 % (FLUSH) 0.9 %
5-40 SYRINGE (ML) INJECTION EVERY 8 HOURS
Status: DISCONTINUED | OUTPATIENT
Start: 2019-12-13 | End: 2019-12-13 | Stop reason: HOSPADM

## 2019-12-13 RX ORDER — LIDOCAINE HYDROCHLORIDE 20 MG/ML
INJECTION, SOLUTION EPIDURAL; INFILTRATION; INTRACAUDAL; PERINEURAL AS NEEDED
Status: DISCONTINUED | OUTPATIENT
Start: 2019-12-13 | End: 2019-12-13 | Stop reason: HOSPADM

## 2019-12-13 RX ORDER — INSULIN LISPRO 100 [IU]/ML
INJECTION, SOLUTION INTRAVENOUS; SUBCUTANEOUS ONCE
Status: DISCONTINUED | OUTPATIENT
Start: 2019-12-13 | End: 2019-12-13 | Stop reason: HOSPADM

## 2019-12-13 RX ORDER — SODIUM CHLORIDE, SODIUM LACTATE, POTASSIUM CHLORIDE, CALCIUM CHLORIDE 600; 310; 30; 20 MG/100ML; MG/100ML; MG/100ML; MG/100ML
50 INJECTION, SOLUTION INTRAVENOUS CONTINUOUS
Status: DISCONTINUED | OUTPATIENT
Start: 2019-12-13 | End: 2019-12-13 | Stop reason: HOSPADM

## 2019-12-13 RX ADMIN — PROPOFOL 50 MG: 10 INJECTION, EMULSION INTRAVENOUS at 13:42

## 2019-12-13 RX ADMIN — PROPOFOL 50 MG: 10 INJECTION, EMULSION INTRAVENOUS at 14:12

## 2019-12-13 RX ADMIN — LIDOCAINE HYDROCHLORIDE 100 MG: 20 INJECTION, SOLUTION EPIDURAL; INFILTRATION; INTRACAUDAL; PERINEURAL at 13:38

## 2019-12-13 RX ADMIN — PROPOFOL 50 MG: 10 INJECTION, EMULSION INTRAVENOUS at 14:00

## 2019-12-13 RX ADMIN — PROPOFOL 50 MG: 10 INJECTION, EMULSION INTRAVENOUS at 13:46

## 2019-12-13 RX ADMIN — PROPOFOL 50 MG: 10 INJECTION, EMULSION INTRAVENOUS at 13:55

## 2019-12-13 RX ADMIN — PROPOFOL 50 MG: 10 INJECTION, EMULSION INTRAVENOUS at 14:06

## 2019-12-13 RX ADMIN — FAMOTIDINE 20 MG: 10 INJECTION, SOLUTION INTRAVENOUS at 11:03

## 2019-12-13 RX ADMIN — SODIUM CHLORIDE, SODIUM LACTATE, POTASSIUM CHLORIDE, AND CALCIUM CHLORIDE 75 ML/HR: 600; 310; 30; 20 INJECTION, SOLUTION INTRAVENOUS at 11:02

## 2019-12-13 RX ADMIN — PROPOFOL 50 MG: 10 INJECTION, EMULSION INTRAVENOUS at 13:50

## 2019-12-13 RX ADMIN — PROPOFOL 200 MG: 10 INJECTION, EMULSION INTRAVENOUS at 13:38

## 2019-12-13 NOTE — PROCEDURES
[email or web address]  [phone]        Brief Procedure Note    Heaven Cannon  1965  [id number]    Date of Procedure: 12/13/2019    Preoperative diagnosis:   793.99 - R93.89,  Computed tomography result abnormal  789.01 - R10.11,  Right upper quadrant pain  V76.51 -Z12.11,  Colon cancer screening      Postoperative diagnosis: EGD: Hiatal hernia  COLO: Ascending polyps x2, transverse polyps x4, sigmoid polyp, rectal polyps x2, internal hemorrhoids, diverticulosis      Description of Findings: same    Sedation/Anesthesia: Monitored Anesthesia Care; See Anesthesia Note    Procedure: Procedure(s):  UPPER ENDOSCOPY  COLONOSCOPY, polypectomy    :  Dr. Eder Yates MD    Assistant(s): Endoscopy Technician-1: Nettie Shanks; Kalpana aMria  Endoscopy RN-1: Megan Barone RN; Heather Silva RN; Martha Madrid    EBL:None    Specimens:   ID Type Source Tests Collected by Time Destination   1 : Ascending polyps Preservative Colon  Dawson Foster MD 12/13/2019 1352 Pathology   2 : Transverse polyps Preservative Colon  Dawson Foster MD 12/13/2019 1359 Pathology   3 : Sigmoid polyp Preservative Colon  Dawson Foster MD 12/13/2019 1415 Pathology   4 : Rectal polyps Preservative Colon  Dawson Foster MD 12/13/2019 1416 Pathology       Findings: See printed and scanned procedure note    Complications: None    Tissue Implant Device: None    Dr. Eder Yates MD  12/13/2019  2:28 PM    Eder Yates MD  [facility] [address]  [place]  [phone]  [email or web address]. [employer]

## 2019-12-13 NOTE — ANESTHESIA POSTPROCEDURE EVALUATION
Procedure(s):  UPPER ENDOSCOPY  COLONOSCOPY, polypectomy. MAC    Anesthesia Post Evaluation      Multimodal analgesia: multimodal analgesia used between 6 hours prior to anesthesia start to PACU discharge  Patient location during evaluation: bedside  Patient participation: complete - patient participated  Level of consciousness: awake and alert  Pain score: 0  Pain management: adequate  Airway patency: patent  Anesthetic complications: no  Cardiovascular status: acceptable  Respiratory status: acceptable  Hydration status: acceptable  Post anesthesia nausea and vomiting:  none      Vitals Value Taken Time   /78 12/13/2019  2:54 PM   Temp 36.6 °C (97.9 °F) 12/13/2019  2:28 PM   Pulse 67 12/13/2019  2:55 PM   Resp 18 12/13/2019  2:55 PM   SpO2 100 % 12/13/2019  2:55 PM   Vitals shown include unvalidated device data.

## 2019-12-13 NOTE — ANESTHESIA PREPROCEDURE EVALUATION
Relevant Problems   No relevant active problems       Anesthetic History   No history of anesthetic complications            Review of Systems / Medical History  Patient summary reviewed, nursing notes reviewed and pertinent labs reviewed    Pulmonary  Within defined limits                 Neuro/Psych     seizures         Cardiovascular  Within defined limits                     GI/Hepatic/Renal           Liver disease     Endo/Other  Within defined limits           Other Findings              Physical Exam    Airway  Mallampati: I  TM Distance: 4 - 6 cm  Neck ROM: normal range of motion   Mouth opening: Normal     Cardiovascular  Regular rate and rhythm,  S1 and S2 normal,  no murmur, click, rub, or gallop             Dental  No notable dental hx       Pulmonary  Breath sounds clear to auscultation               Abdominal  GI exam deferred       Other Findings            Anesthetic Plan    ASA: 2  Anesthesia type: MAC          Induction: Intravenous  Anesthetic plan and risks discussed with: Patient

## 2019-12-13 NOTE — DISCHARGE INSTRUCTIONS
Colon Polyps: Care Instructions  Your Care Instructions    Colon polyps are growths in the colon or the rectum. The cause of most colon polyps is not known, and most people who get them do not have any problems. But a certain kind can turn into cancer. For this reason, regular testing for colon polyps is important for people as they get older. It is also important for anyone who has an increased risk for colon cancer. Polyps are usually found through routine colon cancer screening tests. Although most colon polyps are not cancerous, they are usually removed and then tested for cancer. Screening for colon cancer saves lives because the cancer can usually be cured if it is caught early. If you have a polyp that is the type that can turn into cancer, you may need more tests to examine your entire colon. The doctor will remove any other polyps that he or she finds, and you will be tested more often. Follow-up care is a key part of your treatment and safety. Be sure to make and go to all appointments, and call your doctor if you are having problems. It's also a good idea to know your test results and keep a list of the medicines you take. How can you care for yourself at home? Regular exams to look for colon polyps are the best way to prevent polyps from turning into colon cancer. These can include stool tests, sigmoidoscopy, colonoscopy, and CT colonography. Talk with your doctor about a testing schedule that is right for you. To prevent polyps  There is no home treatment that can prevent colon polyps. But these steps may help lower your risk for cancer. · Stay active. Being active can help you get to and stay at a healthy weight. Try to exercise on most days of the week. Walking is a good choice. · Eat well. Choose a variety of vegetables, fruits, legumes (such as peas and beans), fish, poultry, and whole grains. · Do not smoke.  If you need help quitting, talk to your doctor about stop-smoking programs and medicines. These can increase your chances of quitting for good. · If you drink alcohol, limit how much you drink. Limit alcohol to 2 drinks a day for men and 1 drink a day for women. When should you call for help? Call your doctor now or seek immediate medical care if:    · You have severe belly pain.     · Your stools are maroon or very bloody.    Watch closely for changes in your health, and be sure to contact your doctor if:    · You have a fever.     · You have nausea or vomiting.     · You have a change in bowel habits (new constipation or diarrhea).     · Your symptoms get worse or are not improving as expected. Where can you learn more? Go to http://jt-julio.info/. Enter 95 [id number] in the search box to learn more about \"Colon Polyps: Care Instructions. \"  Current as of: December 19, 2018  Content Version: 12.2  © 9002-3955 [employer]. Care instructions adapted under license by [employer] (which disclaims liability or warranty for this information). If you have questions about a medical condition or this instruction, always ask your healthcare professional. [place] [address] any warranty or liability for your use of this information. Hiatal Hernia: Care Instructions  Your Care Instructions  A hiatal hernia occurs when part of the stomach bulges into the chest cavity. A hiatal hernia may allow stomach acid and juices to back up into the esophagus (acid reflux). This can cause a feeling of burning, warmth, heat, or pain behind the breastbone. This feeling may often occur after you eat, soon after you lie down, or when you bend forward, and it may come and go. You also may have a sour taste in your mouth. These symptoms are commonly known as heartburn or reflux. But not all hiatal hernias cause symptoms. Follow-up care is a key part of your treatment and safety.  Be sure to make and go to all appointments, and call your doctor if you are having problems. It's also a good idea to know your test results and keep a list of the medicines you take. How can you care for yourself at home? · Take your medicines exactly as prescribed. Call your doctor if you think you are having a problem with your medicine. · Do not take aspirin or other nonsteroidal anti-inflammatory drugs (NSAIDs), such as ibuprofen (Advil, Motrin) or naproxen (Aleve), unless your doctor says it is okay. Ask your doctor what you can take for pain. · Your doctor may recommend over-the-counter medicine. For mild or occasional indigestion, antacids such as Tums, Gaviscon, Maalox, or Mylanta may help. Your doctor also may recommend over-the-counter acid reducers, such as famotidine (Pepcid AC), cimetidine (Tagamet HB), ranitidine (Zantac 75 and Zantac 150), or omeprazole (Prilosec). Read and follow all instructions on the label. If you use these medicines often, talk with your doctor. · Change your eating habits. ? It's best to eat several small meals instead of two or three large meals. ? After you eat, wait 2 to 3 hours before you lie down. Late-night snacks aren't a good idea. ? Chocolate, mint, and alcohol can make heartburn worse. They relax the valve between the esophagus and the stomach. ? Spicy foods, foods that have a lot of acid (like tomatoes and oranges), and coffee can make heartburn symptoms worse in some people. If your symptoms are worse after you eat a certain food, you may want to stop eating that food to see if your symptoms get better. · Do not smoke or chew tobacco.  · If you get heartburn at night, raise the head of your bed 6 to 8 inches by putting the frame on blocks or placing a foam wedge under the head of your mattress. (Adding extra pillows does not work.)  · Do not wear tight clothing around your middle. · Lose weight if you need to. Losing just 5 to 10 pounds can help. When should you call for help?   Call your doctor now or seek immediate medical care if:    · You have new or worse belly pain.     · You are vomiting.    Watch closely for changes in your health, and be sure to contact your doctor if:    · You have new or worse symptoms of indigestion.     · You have trouble or pain swallowing.     · You are losing weight.     · You do not get better as expected. Where can you learn more? Go to http://jt-julio.info/. Enter E925 in the search box to learn more about \"Hiatal Hernia: Care Instructions. \"  Current as of: November 7, 2018  Content Version: 12.2  © 7649-0516 [employer]. Care instructions adapted under license by [employer] (which disclaims liability or warranty for this information). If you have questions about a medical condition or this instruction, always ask your healthcare professional. [address] any warranty or liability for your use of this information. DISCHARGE SUMMARY from Nurse    PATIENT INSTRUCTIONS:    After general anesthesia or intravenous sedation, for 24 hours or while taking prescription Narcotics:  · Limit your activities  · Do not drive and operate hazardous machinery  · Do not make important personal or business decisions  · Do  not drink alcoholic beverages  · If you have not urinated within 8 hours after discharge, please contact your surgeon on call. Report the following to your surgeon:  · Excessive pain, swelling, redness or odor of or around the surgical area  · Temperature over 100.5  · Nausea and vomiting lasting longer than 4 hours or if unable to take medications  · Any signs of decreased circulation or nerve impairment to extremity: change in color, persistent  numbness, tingling, coldness or increase pain  · Any questions    *  Please give a list of your current medications to your Primary Care Provider.     *  Please update this list whenever your medications are discontinued, doses are      changed, or new medications (including over-the-counter products) are added. *  Please carry medication information at all times in case of emergency situations. These are general instructions for a healthy lifestyle:    No smoking/ No tobacco products/ Avoid exposure to second hand smoke  Surgeon General's Warning:  Quitting smoking now greatly reduces serious risk to your health. Obesity, smoking, and sedentary lifestyle greatly increases your risk for illness    A healthy diet, regular physical exercise & weight monitoring are important for maintaining a healthy lifestyle    You may be retaining fluid if you have a history of heart failure or if you experience any of the following symptoms:  Weight gain of 3 pounds or more overnight or 5 pounds in a week, increased swelling in our hands or feet or shortness of breath while lying flat in bed. Please call your doctor as soon as you notice any of these symptoms; do not wait until your next office visit. The discharge information has been reviewed with the patient/son. The patient/son verbalized understanding. Discharge medications reviewed with the patient/son and appropriate educational materials and side effects teaching were provided.   ___________________________________________________________________________________________________________________________________

## 2019-12-13 NOTE — H&P
[email or web address]  [phone]      History and Physical    Patient: Javier Ruvalcaba MRN: [id number]  SSN: [id number]    YOB: 1965  Age: 49 y.o. Sex: male      Subjective:      Javier Ruvalcaba is a 49 y.o. male who presents with cirrhosis and RUQ pain; due for CRCS. Past Medical History:   Diagnosis Date    Alcohol abuse     Anxiety     Seizures ([address]     Alcohol induced per pt-doesn't remember when     Past Surgical History:   Procedure Laterality Date    HX ORTHOPAEDIC  2004    Left knee sx      No family history on file. Social History     Tobacco Use    Smoking status: Current Every Day Smoker     Packs/day: 2.00     Years: 35.00     Pack years: 70.00    Smokeless tobacco: Never Used   Substance Use Topics    Alcohol use: Not Currently     Alcohol/week: 4.0 standard drinks     Types: 4 Cans of beer per week     Comment: 12 beers a day-fifth bourbon      Prior to Admission medications    Medication Sig Start Date End Date Taking? Authorizing Provider   busPIRone (BUSPAR) 5 mg tablet Take 2 Tabs by mouth. Indications: Repeated Episodes of Anxiety 9/26/19  Yes Provider, Historical   folic acid (FOLVITE) 1 mg tablet Take 1 Tab by mouth. 9/26/19  Yes Provider, Historical   magnesium oxide (MAG-OX) 400 mg tablet TAKE 1 TABLET BY MOUTH ONCE DAILY 9/26/19  Yes Provider, Historical   vitamin B complex (B-100 PO) Take  by mouth. Yes Provider, Historical   vitamin E acetate (VITAMIN E PO) Take  by mouth. Yes Provider, Historical   therapeutic multivitamin (THERAGRAN) tablet Take 1 Tab by mouth daily. 4/30/19  Yes Ashley Johnson MD   dicyclomine (BENTYL) 20 mg tablet Take 20 mg by mouth every six (6) hours. Other, MD Ziyad   ziprasidone (GEODON) 40 mg capsule Take 1 Cap by mouth two (2) times daily (with meals). Indications:  Additional Medications to Treat Depression 6/17/19   Joo Beltran MD   lactulose (CHRONULAC) 10 gram/15 mL solution Take 15 mL by mouth daily as needed (constipation). 4/30/19   Emma Johnson MD   metoprolol tartrate (LOPRESSOR) 25 mg tablet Take 0.5 Tabs by mouth two (2) times a day. 4/30/19   Emma Johnson MD   naltrexone (DEPADE) 50 mg tablet Take 1 Tab by mouth daily. 1/17/19   Libra Hargrove MD        Allergies   Allergen Reactions    Amoxicillin Unknown (comments)       Review of Systems:  A comprehensive review of systems was negative except for that written in the History of Present Illness. Objective:     Vitals:    12/04/19 1514   Weight: 78.9 kg (174 lb)   Height: 5' 8\" (1.727 m)        Physical Exam:  GENERAL: alert, cooperative, no distress, appears stated age  LUNG: clear to auscultation bilaterally  HEART: regular rate and rhythm, S1, S2 normal, no murmur, click, rub or gallop  ABDOMEN: soft, non-tender. Bowel sounds normal. No masses,  no organomegaly  NEUROLOGIC: alert & oriented x 3    Assessment:     1. Cirrhosis  2. RUQ pain  3. CRCS    Plan:     1. EGD/colonoscopy    Signed By: Madeleine Guzman MD     December 13, 2019      Madeleine Guzman MD  [facility] [address] - [phone]  [email or web address]

## 2020-02-24 ENCOUNTER — HOSPITAL ENCOUNTER (OUTPATIENT)
Dept: GENERAL RADIOLOGY | Age: 55
Discharge: HOME | End: 2020-02-24
Payer: COMMERCIAL

## 2020-02-24 DIAGNOSIS — M79.671 INTRACTABLE RIGHT HEEL PAIN: ICD-10-CM

## 2020-02-24 PROCEDURE — 73650 X-RAY EXAM OF HEEL: CPT

## 2021-03-31 PROBLEM — R10.84 GENERALIZED ABDOMINAL PAIN: Status: ACTIVE | Noted: 2021-03-31

## 2021-03-31 PROBLEM — R06.09 EXERTIONAL DYSPNEA: Status: ACTIVE | Noted: 2021-03-31

## 2022-05-13 NOTE — PROGRESS NOTES
Pt will likely d/c home today, pt states he has a ride through family/friend. Pt is scheduled to follow up for SA/MH treatment at [facility], appt scheduled and provided to pt. Pt also given information for accepting inpatient rehab should pt change his mind and need a higher level of treatment. Patient agreeable to this plan. [facility] [phone] No

## (undated) DEVICE — AIRLIFE™ NASAL OXYGEN CANNULA CURVED, NONFLARED TIP WITH 14 FOOT (4.3 M) CRUSH-RESISTANT TUBING, OVER-THE-EAR STYLE: Brand: AIRLIFE™

## (undated) DEVICE — MEDI-VAC SUCTION HIGH CAPACITY: Brand: CARDINAL HEALTH

## (undated) DEVICE — AIRLIFE™ NASAL OXYGEN CANNULA CURVED, FLARED TIP WITH 14 FOOT (4.3 M) CRUSH-RESISTANT TUBING, OVER-THE-EAR STYLE: Brand: AIRLIFE™

## (undated) DEVICE — SOLUTION IRRIG 1000ML H2O STRL BLT

## (undated) DEVICE — BITE BLOCK ENDOSCP UNIV AD 6 TO 9.4 MM

## (undated) DEVICE — MEDI-VAC NON-CONDUCTIVE SUCTION TUBING: Brand: CARDINAL HEALTH

## (undated) DEVICE — FLUFF AND POLYMER UNDERPAD,EXTRA HEAVY: Brand: WINGS

## (undated) DEVICE — SYR 20ML LL STRL LF --

## (undated) DEVICE — TRAP SPEC COLL POLYP POLYSTYR --

## (undated) DEVICE — ENDOSCOPY PUMP TUBING/ CAP SET: Brand: ERBE

## (undated) DEVICE — SNARE VASC L240CM LOOP W10MM SHTH DIA2.4MM RND STIFF CLD

## (undated) DEVICE — CATHETER SUCT TR FL TIP 14FR W/ O CTRL

## (undated) DEVICE — FCPS RAD JAW 4LC 240CM W/NDL -- BX/20 RADIAL JAW 4

## (undated) DEVICE — STERILE POLYISOPRENE POWDER-FREE SURGICAL GLOVES: Brand: PROTEXIS

## (undated) DEVICE — SYR 50ML SLIP TIP NSAF LF STRL --

## (undated) DEVICE — GAUZE,SPONGE,4"X4",16PLY,STRL,LF,10/TRAY: Brand: MEDLINE

## (undated) DEVICE — SNARE POLYP M W27MMXL240CM OVL STIFF DISP CAPTIVATOR

## (undated) DEVICE — SYRINGE MED 25GA 3ML L5/8IN SUBQ PLAS W/ DETACH NDL SFTY

## (undated) DEVICE — SYR 10ML LUER LOK 1/5ML GRAD --

## (undated) DEVICE — FORCEPS BX L240CM JAW DIA2.8MM L CAP W/ NDL MIC MESH TOOTH

## (undated) DEVICE — FLEX ADVANTAGE 3000CC: Brand: FLEX ADVANTAGE

## (undated) DEVICE — BASIN EMESIS 500CC ROSE 250/CS 60/PLT: Brand: MEDEGEN MEDICAL PRODUCTS, LLC

## (undated) DEVICE — GOWN ISOL IMPERV UNIV, DISP, OPEN BACK, BLUE --

## (undated) DEVICE — CANNULA ORIG TL CLR W FOAM CUSHIONS AND 14FT SUPL TB 3 CHN